# Patient Record
Sex: MALE | Race: WHITE | NOT HISPANIC OR LATINO | Employment: OTHER | URBAN - NONMETROPOLITAN AREA
[De-identification: names, ages, dates, MRNs, and addresses within clinical notes are randomized per-mention and may not be internally consistent; named-entity substitution may affect disease eponyms.]

---

## 2017-01-16 ENCOUNTER — OFFICE VISIT (OUTPATIENT)
Dept: INTERNAL MEDICINE | Facility: CLINIC | Age: 82
End: 2017-01-16

## 2017-01-16 VITALS
TEMPERATURE: 98.5 F | OXYGEN SATURATION: 97 % | SYSTOLIC BLOOD PRESSURE: 120 MMHG | HEART RATE: 67 BPM | HEIGHT: 71 IN | BODY MASS INDEX: 24.83 KG/M2 | WEIGHT: 177.38 LBS | DIASTOLIC BLOOD PRESSURE: 80 MMHG

## 2017-01-16 DIAGNOSIS — N40.1 BENIGN NON-NODULAR PROSTATIC HYPERPLASIA WITH LOWER URINARY TRACT SYMPTOMS: ICD-10-CM

## 2017-01-16 DIAGNOSIS — F02.80 LATE ONSET ALZHEIMER'S DISEASE WITHOUT BEHAVIORAL DISTURBANCE (HCC): Primary | ICD-10-CM

## 2017-01-16 DIAGNOSIS — G30.1 LATE ONSET ALZHEIMER'S DISEASE WITHOUT BEHAVIORAL DISTURBANCE (HCC): Primary | ICD-10-CM

## 2017-01-16 LAB
ALBUMIN SERPL-MCNC: 3.7 G/DL (ref 3.2–4.8)
ALBUMIN/GLOB SERPL: 1.2 G/DL (ref 1.5–2.5)
ALP SERPL-CCNC: 76 U/L (ref 25–100)
ALT SERPL W P-5'-P-CCNC: 18 U/L (ref 7–40)
ANION GAP SERPL CALCULATED.3IONS-SCNC: 11 MMOL/L (ref 3–11)
AST SERPL-CCNC: 24 U/L (ref 0–33)
BILIRUB SERPL-MCNC: 0.4 MG/DL (ref 0.3–1.2)
BUN BLD-MCNC: 23 MG/DL (ref 9–23)
BUN/CREAT SERPL: 20.9 (ref 7–25)
CALCIUM SPEC-SCNC: 9.5 MG/DL (ref 8.7–10.4)
CHLORIDE SERPL-SCNC: 104 MMOL/L (ref 99–109)
CO2 SERPL-SCNC: 28 MMOL/L (ref 20–31)
CREAT BLD-MCNC: 1.1 MG/DL (ref 0.6–1.3)
FOLATE SERPL-MCNC: 12.04 NG/ML (ref 3.2–20)
GFR SERPL CREATININE-BSD FRML MDRD: 64 ML/MIN/1.73
GLOBULIN UR ELPH-MCNC: 3.2 GM/DL
GLUCOSE BLD-MCNC: 73 MG/DL (ref 70–100)
POTASSIUM BLD-SCNC: 4.5 MMOL/L (ref 3.5–5.5)
PROT SERPL-MCNC: 6.9 G/DL (ref 5.7–8.2)
SODIUM BLD-SCNC: 143 MMOL/L (ref 132–146)
VIT B12 BLD-MCNC: 594 PG/ML (ref 211–911)

## 2017-01-16 PROCEDURE — 82746 ASSAY OF FOLIC ACID SERUM: CPT | Performed by: INTERNAL MEDICINE

## 2017-01-16 PROCEDURE — 84207 ASSAY OF VITAMIN B-6: CPT | Performed by: INTERNAL MEDICINE

## 2017-01-16 PROCEDURE — 82607 VITAMIN B-12: CPT | Performed by: INTERNAL MEDICINE

## 2017-01-16 PROCEDURE — 80053 COMPREHEN METABOLIC PANEL: CPT | Performed by: INTERNAL MEDICINE

## 2017-01-16 PROCEDURE — 99213 OFFICE O/P EST LOW 20 MIN: CPT | Performed by: INTERNAL MEDICINE

## 2017-01-16 PROCEDURE — 36415 COLL VENOUS BLD VENIPUNCTURE: CPT | Performed by: INTERNAL MEDICINE

## 2017-01-16 RX ORDER — FINASTERIDE 5 MG/1
5 TABLET, FILM COATED ORAL DAILY
Qty: 90 TABLET | Refills: 3 | Status: SHIPPED | OUTPATIENT
Start: 2017-01-16 | End: 2017-07-17 | Stop reason: SDUPTHER

## 2017-01-16 NOTE — MR AVS SNAPSHOT
Sd Anderson   1/16/2017 9:30 AM   Office Visit    Provider:  Andrey Jade MD   Department:  [facility]   Dept Phone:  [phone]                Your Full Care Plan              Today's Medication Changes          These changes are accurate as of: 1/16/17  9:55 AM.  If you have any questions, ask your nurse or doctor.               Stop taking medication(s)listed here:     nitrofurantoin 100 MG capsule   Commonly known as:  MACRODANTIN   Stopped by:  Andrey Jade MD                Where to Get Your Medications      These medications were sent to [id number] - [place], [place] - [address] - [phone]  - [phone] FX  [address] [address]     Phone:  [phone]     finasteride 5 MG tablet                  Your Updated Medication List          This list is accurate as of: 1/16/17  9:55 AM.  Always use your most recent med list.                finasteride 5 MG tablet   Commonly known as:  PROSCAR   Take 1 tablet by mouth Daily.       latanoprost 0.005 % ophthalmic solution   Commonly known as:  XALATAN       tamsulosin 0.4 MG capsule 24 hr capsule   Commonly known as:  FLOMAX   Take 1 capsule by mouth every night.               We Performed the Following     Comprehensive Metabolic Panel     Folate     Vitamin B12     Vitamin B6       You Were Diagnosed With        Codes Comments    Late onset Alzheimer's disease without behavioral disturbance    -  Primary ICD-10-CM: G30.1, F02.80  ICD-9-CM: 331.0, 294.10     Benign non-nodular prostatic hyperplasia with lower urinary tract symptoms     ICD-10-CM: N40.1  ICD-9-CM: 600.91       Instructions     None    Patient Instructions History      MyChart Signup     Our records indicate that you have declined [facility] [email or web address] signup. If you would like to sign up for [employer], please email [email or web address] or call [phone] to obtain an activation  "code.             Other Info from Your Visit           Allergies     Levofloxacin        Reason for Visit     Follow-up 6 month      Vital Signs     Blood Pressure Pulse Temperature Height Weight Oxygen Saturation    120/80 67 98.5 °F (36.9 °C) 71\" (180.3 cm) 177 lb 6 oz (80.5 kg) 97%    Body Mass Index Smoking Status                24.74 kg/m2 Former Smoker          Problems and Diagnoses Noted     Benign non-nodular prostatic hyperplasia with lower urinary tract symptoms    Alzheimer's disease      Results       "

## 2017-01-16 NOTE — PROGRESS NOTES
Subjective  Sd Anderson is a 83 y.o. male    HPI coming in for follow-up he is accompanied by his wife was giving us some of the history patient with a history of BPH nocturia 2-3 times a night is on Flomax and finasteride history of dementia with significant short-term memory deficit. No alcohol or tobacco use    The following portions of the patient's history were reviewed and updated as appropriate: allergies, current medications, past family history, past medical history, past social history, past surgical history, and problem list.     Review of Systems   Constitutional: Negative.  Negative for activity change, appetite change, fatigue and fever.   HENT: Positive for congestion, rhinorrhea and sneezing. Negative for ear discharge, ear pain and trouble swallowing.    Eyes: Positive for itching. Negative for photophobia and visual disturbance.   Respiratory: Positive for cough and shortness of breath.    Cardiovascular: Negative for chest pain and palpitations.   Gastrointestinal: Negative for abdominal distention, abdominal pain, constipation, diarrhea, nausea and vomiting.   Endocrine: Negative.    Genitourinary: Positive for frequency and urgency. Negative for dysuria and hematuria.   Musculoskeletal: Positive for arthralgias and back pain. Negative for joint swelling and myalgias.   Skin: Negative for color change and rash.   Allergic/Immunologic: Positive for environmental allergies.   Neurological: Negative for dizziness, weakness, light-headedness and headaches.   Hematological: Negative for adenopathy. Does not bruise/bleed easily.   Psychiatric/Behavioral: Positive for confusion and sleep disturbance. Negative for agitation and dysphoric mood. The patient is not nervous/anxious.        Vitals:    01/16/17 0917   BP: 120/80   Pulse: 67   Temp: 98.5 °F (36.9 °C)   SpO2: 97%       Objective  Physical Exam   Constitutional: He is oriented to person, place, and time. He appears well-developed and  well-nourished. No distress.   HENT:   Nose: Nose normal.   Mouth/Throat: Oropharynx is clear and moist.   Eyes: Conjunctivae and EOM are normal. No scleral icterus.   Neck: No tracheal deviation present. No thyromegaly present.   Cardiovascular: Normal rate and regular rhythm.  Exam reveals no friction rub.    No murmur heard.  Pulmonary/Chest: No respiratory distress. He has no wheezes. He has no rales.   Abdominal: Soft. He exhibits no distension and no mass. There is no tenderness. There is no guarding.   Musculoskeletal: Normal range of motion. He exhibits no deformity.   Lymphadenopathy:     He has no cervical adenopathy.   Neurological: He is alert and oriented to person, place, and time. He has normal reflexes. No cranial nerve deficit. Coordination normal.   Skin: Skin is warm and dry. No rash noted. No erythema.   Psychiatric: He has a normal mood and affect. His behavior is normal.       Diagnoses and all orders for this visit:    Late onset Alzheimer's disease without behavioral disturbance. Behaviorally stable repeat lab work to rule out vitamin deficiencies    Benign non-nodular prostatic hyperplasia with lower urinary tract symptoms he did cut down on fluid intake and p.m. continue with Flomax and finasteride

## 2017-01-18 ENCOUNTER — TELEPHONE (OUTPATIENT)
Dept: INTERNAL MEDICINE | Facility: CLINIC | Age: 82
End: 2017-01-18

## 2017-01-18 LAB — VIT B6 SERPL-MCNC: 6.3 UG/L (ref 5.3–46.7)

## 2017-01-18 NOTE — TELEPHONE ENCOUNTER
----- Message from Andrey Jade MD sent at 1/17/2017 11:16 AM EST -----  Please inform patient labs are okay.

## 2017-02-14 ENCOUNTER — OFFICE VISIT (OUTPATIENT)
Dept: INTERNAL MEDICINE | Facility: CLINIC | Age: 82
End: 2017-02-14

## 2017-02-14 VITALS
WEIGHT: 177 LBS | OXYGEN SATURATION: 99 % | DIASTOLIC BLOOD PRESSURE: 70 MMHG | TEMPERATURE: 98.1 F | HEART RATE: 72 BPM | SYSTOLIC BLOOD PRESSURE: 120 MMHG | HEIGHT: 71 IN | BODY MASS INDEX: 24.78 KG/M2

## 2017-02-14 DIAGNOSIS — M19.90 ARTHRITIS: ICD-10-CM

## 2017-02-14 DIAGNOSIS — R26.9 ABNORMALITY OF GAIT: ICD-10-CM

## 2017-02-14 DIAGNOSIS — G89.29 CHRONIC BILATERAL LOW BACK PAIN WITHOUT SCIATICA: Primary | ICD-10-CM

## 2017-02-14 DIAGNOSIS — M54.50 CHRONIC BILATERAL LOW BACK PAIN WITHOUT SCIATICA: Primary | ICD-10-CM

## 2017-02-14 PROCEDURE — 99213 OFFICE O/P EST LOW 20 MIN: CPT | Performed by: PHYSICIAN ASSISTANT

## 2017-02-14 RX ORDER — NAPROXEN 375 MG/1
375 TABLET ORAL 2 TIMES DAILY PRN
Qty: 60 TABLET | Refills: 1 | Status: SHIPPED | OUTPATIENT
Start: 2017-02-14 | End: 2018-08-13

## 2017-02-14 RX ORDER — IBUPROFEN 200 MG
200 TABLET ORAL EVERY 6 HOURS PRN
COMMUNITY
End: 2017-02-14

## 2017-02-14 NOTE — PROGRESS NOTES
Sd Anderson is a 83 y.o. male.     Subjective   History of Present Illness   Here today with concern of low back pain into the left hip. Originally had pain in the right hip which transferred to the left hip in the last few weeks.  Pain worse when he wakes each morning when he first gets up as well as when he stands or sits after being in a position for a prolonged period. Pain lessens when he is active. Taking Ibuprofen for pain which helps as well as a topical cream. Denies numbness, tingling or burning.             The following portions of the patient's history were reviewed and updated as appropriate: allergies, current medications, past family history, past medical history, past social history, past surgical history and problem list.    Review of Systems    Constitutional: Negative for appetite change, chills, fatigue, fever and unexpected weight change.   HENT: Negative for congestion, ear pain, hearing loss, nosebleeds, postnasal drip, rhinorrhea, sore throat, tinnitus and trouble swallowing.    Eyes: Negative for photophobia, discharge and visual disturbance.   Respiratory: Negative for cough, chest tightness, shortness of breath and wheezing.    Cardiovascular: Negative for chest pain, palpitations and leg swelling.   Gastrointestinal: Negative for abdominal distention, abdominal pain, blood in stool, constipation, diarrhea, nausea and vomiting.   Endocrine: Negative for cold intolerance, heat intolerance, polydipsia, polyphagia and polyuria.   Musculoskeletal: Low back pain and left hip pain.  Negative for joint swelling, myalgias, neck pain and neck stiffness.   Skin: Negative for color change, pallor, rash and wound.   Allergic/Immunologic: Negative for environmental allergies, food allergies and immunocompromised state.   Neurological: Negative for dizziness, tremors, seizures, weakness, numbness and headaches.   Hematological: Negative for adenopathy. Does not bruise/bleed easily.  "  Psychiatric/Behavioral: Negative for agitation, behavioral problems, confusion, hallucinations, self-injury and suicidal ideas. The patient is not nervous/anxious.      Objective    Physical Exam  Constitutional: Oriented to person, place, and time. Appears well-developed and well-nourished.   HENT:   Head: Normocephalic and atraumatic.   Eyes: EOM are normal. Pupils are equal, round, and reactive to light.   Neck: Normal range of motion. Neck supple.   Cardiovascular: Normal rate, regular rhythm and normal heart sounds.    Pulmonary/Chest: Effort normal and breath sounds normal. No respiratory distress.  Has no wheezes or rales. Exhibits no chest wall tenderness.   Abdominal: Soft. Bowel sounds are normal. Exhibits no distension and no mass. There is no tenderness.   Musculoskeletal: Slowed gait due to pain. Forward flexion causes pulling sensation in bilateral hamstrings. Decreased forward range of motion. Exhibits no vertebral or paraspinal muscle tenderness.   Neurological: Alert and oriented to person, place, and time.   Skin: Skin is warm and dry.   Psychiatric: Has a normal mood and affect. Behavior is normal. Judgment and thought content normal.       Visit Vitals   • /70   • Pulse 72   • Temp 98.1 °F (36.7 °C)   • Ht 71\" (180.3 cm)   • Wt 177 lb (80.3 kg)   • SpO2 99%   • BMI 24.69 kg/m2       Nursing note and vitals reviewed.          Assessment/Plan   Sd was seen today for back pain and hip pain.    Diagnoses and all orders for this visit:    Chronic bilateral low back pain without sciatica  -     naproxen (NAPROSYN) 375 MG tablet; Take 1 tablet by mouth 2 (Two) Times a Day As Needed for mild pain (1-3).  -     Ambulatory Referral to Home Health    Arthritis  -     naproxen (NAPROSYN) 375 MG tablet; Take 1 tablet by mouth 2 (Two) Times a Day As Needed for mild pain (1-3).  -     Ambulatory Referral to Home Health    Abnormality of gait  -     Ambulatory Referral to Home Health             "

## 2017-03-20 ENCOUNTER — OUTSIDE FACILITY SERVICE (OUTPATIENT)
Dept: INTERNAL MEDICINE | Facility: CLINIC | Age: 82
End: 2017-03-20

## 2017-03-20 PROCEDURE — G0180 MD CERTIFICATION HHA PATIENT: HCPCS | Performed by: INTERNAL MEDICINE

## 2017-07-17 ENCOUNTER — OFFICE VISIT (OUTPATIENT)
Dept: INTERNAL MEDICINE | Facility: CLINIC | Age: 82
End: 2017-07-17

## 2017-07-17 VITALS
HEART RATE: 66 BPM | DIASTOLIC BLOOD PRESSURE: 80 MMHG | OXYGEN SATURATION: 94 % | WEIGHT: 177.38 LBS | SYSTOLIC BLOOD PRESSURE: 120 MMHG | HEIGHT: 71 IN | BODY MASS INDEX: 24.83 KG/M2 | TEMPERATURE: 98.6 F

## 2017-07-17 DIAGNOSIS — F02.80 LATE ONSET ALZHEIMER'S DISEASE WITHOUT BEHAVIORAL DISTURBANCE (HCC): Primary | ICD-10-CM

## 2017-07-17 DIAGNOSIS — N40.1 BENIGN NON-NODULAR PROSTATIC HYPERPLASIA WITH LOWER URINARY TRACT SYMPTOMS: ICD-10-CM

## 2017-07-17 DIAGNOSIS — G30.1 LATE ONSET ALZHEIMER'S DISEASE WITHOUT BEHAVIORAL DISTURBANCE (HCC): Primary | ICD-10-CM

## 2017-07-17 DIAGNOSIS — R25.1 TREMOR: ICD-10-CM

## 2017-07-17 PROCEDURE — 99214 OFFICE O/P EST MOD 30 MIN: CPT | Performed by: INTERNAL MEDICINE

## 2017-07-17 PROCEDURE — G0009 ADMIN PNEUMOCOCCAL VACCINE: HCPCS | Performed by: INTERNAL MEDICINE

## 2017-07-17 PROCEDURE — 90670 PCV13 VACCINE IM: CPT | Performed by: INTERNAL MEDICINE

## 2017-07-17 RX ORDER — TAMSULOSIN HYDROCHLORIDE 0.4 MG/1
1 CAPSULE ORAL NIGHTLY
Qty: 90 CAPSULE | Refills: 3 | Status: SHIPPED | OUTPATIENT
Start: 2017-07-17 | End: 2018-09-10 | Stop reason: SDUPTHER

## 2017-07-17 RX ORDER — FINASTERIDE 5 MG/1
5 TABLET, FILM COATED ORAL DAILY
Qty: 90 TABLET | Refills: 3 | Status: SHIPPED | OUTPATIENT
Start: 2017-07-17 | End: 2018-08-20 | Stop reason: SDUPTHER

## 2017-07-17 NOTE — PROGRESS NOTES
Subjective  Sd Anderson is a 83 y.o. male    HPI coming in for follow-up is accompanied by his wife patient with significant short-term memory deficit and tremors history of BPH but without dysuria or hematuria nonsmoker tolerating his medications well    The following portions of the patient's history were reviewed and updated as appropriate: allergies, current medications, past family history, past medical history, past social history, past surgical history, and problem list.     Review of Systems   Constitutional: Negative.  Negative for activity change, appetite change, fatigue and fever.   HENT: Negative for congestion, ear discharge, ear pain and trouble swallowing.    Eyes: Negative for photophobia and visual disturbance.   Respiratory: Negative for cough and shortness of breath.    Cardiovascular: Negative for chest pain and palpitations.   Gastrointestinal: Negative for abdominal distention, abdominal pain, constipation, diarrhea, nausea and vomiting.   Endocrine: Negative.    Genitourinary: Positive for difficulty urinating. Negative for dysuria, hematuria and urgency.   Musculoskeletal: Negative for arthralgias, back pain, joint swelling and myalgias.   Skin: Negative for color change and rash.   Allergic/Immunologic: Negative.    Neurological: Positive for tremors. Negative for dizziness, weakness, light-headedness and headaches.   Hematological: Negative for adenopathy. Does not bruise/bleed easily.   Psychiatric/Behavioral: Positive for confusion and sleep disturbance. Negative for agitation and dysphoric mood. The patient is not nervous/anxious.        Vitals:    07/17/17 1400   BP: 120/80   Pulse: 66   Temp: 98.6 °F (37 °C)   SpO2: 94%       Objective  Physical Exam   Constitutional: He is oriented to person, place, and time. He appears well-developed and well-nourished. No distress.   HENT:   Nose: Nose normal.   Mouth/Throat: Oropharynx is clear and moist.   Eyes: Conjunctivae and EOM are  normal. No scleral icterus.   Neck: No tracheal deviation present. No thyromegaly present.   Cardiovascular: Normal rate and regular rhythm.  Exam reveals no friction rub.    No murmur heard.  Pulmonary/Chest: No respiratory distress. He has no wheezes. He has no rales.   Abdominal: Soft. He exhibits no distension and no mass. There is no tenderness. There is no guarding.   Musculoskeletal: Normal range of motion. He exhibits no deformity.   Lymphadenopathy:     He has no cervical adenopathy.   Neurological: He is alert and oriented to person, place, and time. He has normal reflexes. No cranial nerve deficit. Coordination normal.   Skin: Skin is warm and dry. No rash noted. No erythema.   Lipoma upper back   Psychiatric: He has a normal mood and affect. His behavior is normal.       Diagnoses and all orders for this visit:    Late onset Alzheimer's disease without behavioral disturbance stable without behavioral disturbances sleep okay    Benign non-nodular prostatic hyperplasia with lower urinary tract symptoms continue with tamsulosin and finasteride    Tremor stable    Other orders  -     tamsulosin (FLOMAX) 0.4 MG capsule 24 hr capsule; Take 1 capsule by mouth Every Night.  -     finasteride (PROSCAR) 5 MG tablet; Take 1 tablet by mouth Daily.

## 2017-10-27 ENCOUNTER — OFFICE VISIT (OUTPATIENT)
Dept: INTERNAL MEDICINE | Facility: CLINIC | Age: 82
End: 2017-10-27

## 2017-10-27 VITALS
SYSTOLIC BLOOD PRESSURE: 130 MMHG | BODY MASS INDEX: 25.34 KG/M2 | DIASTOLIC BLOOD PRESSURE: 80 MMHG | WEIGHT: 181 LBS | HEART RATE: 60 BPM | HEIGHT: 71 IN | OXYGEN SATURATION: 97 % | TEMPERATURE: 98.4 F

## 2017-10-27 DIAGNOSIS — R05.9 COUGH: ICD-10-CM

## 2017-10-27 DIAGNOSIS — M79.671 PAIN IN BOTH FEET: Primary | ICD-10-CM

## 2017-10-27 DIAGNOSIS — M79.672 PAIN IN BOTH FEET: Primary | ICD-10-CM

## 2017-10-27 DIAGNOSIS — K30 INDIGESTION: ICD-10-CM

## 2017-10-27 PROCEDURE — 99214 OFFICE O/P EST MOD 30 MIN: CPT | Performed by: INTERNAL MEDICINE

## 2017-10-27 RX ORDER — OMEPRAZOLE 20 MG/1
20 CAPSULE, DELAYED RELEASE ORAL DAILY
Qty: 30 CAPSULE | Refills: 0 | Status: SHIPPED | OUTPATIENT
Start: 2017-10-27 | End: 2018-04-26

## 2017-10-27 NOTE — PROGRESS NOTES
"Subjective  Sd Anderson is a 83 y.o. male    HPI coming in for evaluation brought in by his wife was giving us some of the history has had complaints of right big toe pain along with bilateral foot pain ongoing for about a week no new trauma has had ingrowing toenails in the past has been complaining of epigastric discomfort and indigestion denies constipation no blood in his stools no recent change in meds.  Dry cough for a few days now without associated fever or chills nonsmoker    The following portions of the patient's history were reviewed and updated as appropriate: allergies, current medications, past family history, past medical history, past social history, past surgical history, and problem list.     Review of Systems   Constitutional: Negative.  Negative for activity change, appetite change, fatigue and fever.   HENT: Negative for congestion, ear discharge, ear pain and trouble swallowing.    Eyes: Negative for photophobia and visual disturbance.   Respiratory: Positive for cough. Negative for shortness of breath.    Cardiovascular: Negative for chest pain and palpitations.   Gastrointestinal: Negative for abdominal distention, abdominal pain, constipation, diarrhea, nausea and vomiting.   Endocrine: Negative.    Genitourinary: Positive for difficulty urinating. Negative for dysuria, hematuria and urgency.   Musculoskeletal: Positive for arthralgias. Negative for back pain, joint swelling and myalgias.   Skin: Negative for color change and rash.   Allergic/Immunologic: Negative.    Neurological: Positive for tremors. Negative for dizziness, weakness, light-headedness and headaches.   Hematological: Negative for adenopathy. Does not bruise/bleed easily.   Psychiatric/Behavioral: Positive for confusion and sleep disturbance. Negative for agitation and dysphoric mood. The patient is not nervous/anxious.        Visit Vitals   • /80   • Pulse 60   • Temp 98.4 °F (36.9 °C)   • Ht 71\" (180.3 cm)   • " Wt 181 lb (82.1 kg)   • SpO2 97%   • BMI 25.24 kg/m2       Objective  Physical Exam   Constitutional: He is oriented to person, place, and time. He appears well-developed and well-nourished. No distress.   HENT:   Nose: Nose normal.   Mouth/Throat: Oropharynx is clear and moist.   Eyes: Conjunctivae and EOM are normal. No scleral icterus.   Neck: No tracheal deviation present. No thyromegaly present.   Cardiovascular: Normal rate and regular rhythm.  Exam reveals no friction rub.    No murmur heard.  Pulmonary/Chest: No respiratory distress. He has no wheezes. He has no rales.   Abdominal: Soft. He exhibits no distension and no mass. There is no tenderness. There is no guarding.   Musculoskeletal: Normal range of motion. He exhibits no deformity.   Lymphadenopathy:     He has no cervical adenopathy.   Neurological: He is alert and oriented to person, place, and time. He has normal reflexes. No cranial nerve deficit. Coordination normal.   Skin: Skin is warm and dry. Rash noted. There is erythema.   Psychiatric: He has a normal mood and affect. His behavior is normal. Judgment and thought content normal.       Diagnoses and all orders for this visit:    Pain in both feet discussed cleaning is toes with soap and water. Has evidence of ingrown toenail without evidence of cellulitis hold off on antibiotic use. He is going to follow-up with a podiatrist    Indigestion trial of proton pump inhibitor. Discussed bowel regimen    Cough continue with conservative measures cetirizine.    Other orders  -     omeprazole (priLOSEC) 20 MG capsule; Take 1 capsule by mouth Daily.  -     Cancel: Pneumococcal Conjugate Vaccine 13-Valent All

## 2018-01-11 ENCOUNTER — OFFICE VISIT (OUTPATIENT)
Dept: ORTHOPEDIC SURGERY | Facility: CLINIC | Age: 83
End: 2018-01-11

## 2018-01-11 VITALS — HEIGHT: 72 IN | RESPIRATION RATE: 18 BRPM | WEIGHT: 182 LBS | BODY MASS INDEX: 24.65 KG/M2

## 2018-01-11 DIAGNOSIS — S90.229A SUBUNGUAL HEMATOMA OF FOOT, UNSPECIFIED LATERALITY, INITIAL ENCOUNTER: ICD-10-CM

## 2018-01-11 DIAGNOSIS — B35.1 ONYCHOMYCOSIS: ICD-10-CM

## 2018-01-11 DIAGNOSIS — L60.0 INGROWN NAIL: Primary | ICD-10-CM

## 2018-01-11 PROCEDURE — 99204 OFFICE O/P NEW MOD 45 MIN: CPT | Performed by: PODIATRIST

## 2018-01-11 PROCEDURE — 11721 DEBRIDE NAIL 6 OR MORE: CPT | Performed by: PODIATRIST

## 2018-01-11 NOTE — PROGRESS NOTES
Subjective   Patient ID: Sd Anderson is a 84 y.o. male   Ingrown Toenail of the Right Foot  Comes in today with his wife concerned about his great toenails.  They state that they live and an assisted living facility and typically there is a podiatrist that comes there and cuts her nails for them and has been keeping an eye on his great toenails however he was not coming until later next month and they were concerned and wanted to have these looked at.  He denies any pain or discomfort but just complains of discoloration.  He seems to have slight dementia and his wife states that he does not remember things well.  He denies any history of injury or trauma that he is aware of.  They state they have been like this and discolored for months.  They deny any extension into the skin.    History of Present Illness       Pain Location: Foot  Pain Orientation: Right     Pain Descriptors: Aching        Date Pain First Started:  (October 2017)                 Result of Injury: No       Review of Systems   Constitutional: Negative for diaphoresis, fever and unexpected weight change.   HENT: Negative for dental problem and sore throat.    Eyes: Negative for visual disturbance.   Respiratory: Negative for shortness of breath.    Cardiovascular: Negative for chest pain.   Gastrointestinal: Negative for abdominal pain, constipation, diarrhea, nausea and vomiting.   Genitourinary: Negative for difficulty urinating and frequency.   Neurological: Negative for headaches.   Hematological: Does not bruise/bleed easily.   All other systems reviewed and are negative.      Past Medical History:   Diagnosis Date   • BPH (benign prostatic hyperplasia)    • Dementia    • Urinary tract infection         Past Surgical History:   Procedure Laterality Date   • CATARACT EXTRACTION, BILATERAL         Allergies   Allergen Reactions   • Levofloxacin        History reviewed. No pertinent family history.    Social History     Social History   •  Marital status:      Spouse name: N/A   • Number of children: N/A   • Years of education: N/A     Occupational History   • Not on file.     Social History Main Topics   • Smoking status: Former Smoker   • Smokeless tobacco: Never Used   • Alcohol use No      Comment: rarely   • Drug use: No   • Sexual activity: Not on file     Other Topics Concern   • Not on file     Social History Narrative       Counseling given: Not Answered       I have reviewed all of the above social hx, family hx, surgical hx, medications, allergies & ROS and confirm that it is accurate.  Objective   Physical Exam   Constitutional: He is oriented to person, place, and time. He appears well-developed and well-nourished.   HENT:   Head: Normocephalic and atraumatic.   Nose: Nose normal.   Eyes: Conjunctivae and EOM are normal. Pupils are equal, round, and reactive to light.   Neck: Normal range of motion.   Cardiovascular: Normal rate.    Pulmonary/Chest: Effort normal.   Abdominal: Soft.   Neurological: He is alert and oriented to person, place, and time.   Skin: Skin is warm.   Psychiatric: He has a normal mood and affect. His behavior is normal. Judgment and thought content normal.   Nursing note reviewed.    Ortho Exam  Ortho Exam bilateral  Lower extremity exam:  Vascular: Pulses are palpable, pedal hair is noted, no edema noted, varicose veins noted, CFT is less than 5 seconds  Neuro: Gross sensation and reflexes intact  Derm: Nails 1 through 5 on both feet are thickened, elongated, incurvated, discolored, with subungual debris.  The hallux nails bilaterally are roughly 90% covered with underlying a dried hematoma.  The nails are a mixup purple and brown in color.  There is subtle clearing and normal nail margin approximately.  I am able to scrape out dried blood underneath the nail beds which are not well attached.  There is no extension or abnormality of discoloration within the skin.  MSK: Aside from dorsal digital contractures  of digits 2 through 5 bilaterally and slight hallux valgus deformity there are no significant orthopedic deformities.  Joint range of motion is normal.  Manual muscle testing normal.    Assessment/Plan onychomycosis.  Subungual hematoma bilateral hallux  Independent Review of Radiographic Studies:      Laboratory and Other Studies:     Medical Decision Making:        Procedures nails 1 through 5 on both feet were sharply debrided without incident  [] No procedures were performed in office today.    Sd was seen today for ingrown toenail.    Diagnoses and all orders for this visit:    Ingrown nail    Subungual hematoma of foot, unspecified laterality, initial encounter    Onychomycosis          Recommendations/Plan:  I discussed his nails and the discoloration with him and explained this is dried blood and most likely he is bumping his toes or stubbing his toes or dropping something on them or they're rubbing in his shoe and he just does not realize it or feel it.  I discussed melanoma with him but showed them the dried blood that could be removed from underneath the nail and explained with no extension into the adjacent skin this is less likely.  I explained the timeframe that it takes for her nail to grow out with subungual hematoma.  It seems as if he has a fairly close monitoring between his wife and health care providers in his assisted living facility.  I recommend they continue to monitor this but explained as long as it is not painful I would not recommend any further treatment other than cutting his nails on a regular basis.  I explained if the nails do become painful that sometimes the nail has to be removed to alleviate pressure.  They can follow-up with me when necessary.    Return if symptoms worsen or fail to improve.  Patient agreeable to call or return sooner for any concerns.

## 2018-04-26 ENCOUNTER — OFFICE VISIT (OUTPATIENT)
Dept: INTERNAL MEDICINE | Facility: CLINIC | Age: 83
End: 2018-04-26

## 2018-04-26 VITALS
HEART RATE: 65 BPM | BODY MASS INDEX: 25.06 KG/M2 | HEIGHT: 72 IN | TEMPERATURE: 97.3 F | WEIGHT: 185 LBS | SYSTOLIC BLOOD PRESSURE: 110 MMHG | OXYGEN SATURATION: 97 % | DIASTOLIC BLOOD PRESSURE: 60 MMHG

## 2018-04-26 DIAGNOSIS — F02.80 LATE ONSET ALZHEIMER'S DISEASE WITHOUT BEHAVIORAL DISTURBANCE (HCC): ICD-10-CM

## 2018-04-26 DIAGNOSIS — N40.1 BENIGN NON-NODULAR PROSTATIC HYPERPLASIA WITH LOWER URINARY TRACT SYMPTOMS: Primary | ICD-10-CM

## 2018-04-26 DIAGNOSIS — G30.1 LATE ONSET ALZHEIMER'S DISEASE WITHOUT BEHAVIORAL DISTURBANCE (HCC): ICD-10-CM

## 2018-04-26 DIAGNOSIS — R25.1 TREMOR: ICD-10-CM

## 2018-04-26 PROCEDURE — 99214 OFFICE O/P EST MOD 30 MIN: CPT | Performed by: INTERNAL MEDICINE

## 2018-04-26 NOTE — PROGRESS NOTES
"Subjective  Sd Anderson is a 84 y.o. male    HPI coming in for follow-up is accompanied by his wife patient with Alzheimer type dementia has BPH doing reasonably well with current meds no new complaints behaviorally stable has an erratic sleep    The following portions of the patient's history were reviewed and updated as appropriate: allergies, current medications, past family history, past medical history, past social history, past surgical history, and problem list.     Review of Systems   Constitutional: Negative.  Negative for activity change, appetite change, fatigue and fever.   HENT: Negative for congestion, ear discharge, ear pain and trouble swallowing.    Eyes: Negative for photophobia and visual disturbance.   Respiratory: Positive for cough. Negative for shortness of breath.    Cardiovascular: Negative for chest pain and palpitations.   Gastrointestinal: Negative for abdominal distention, abdominal pain, constipation, diarrhea, nausea and vomiting.   Endocrine: Negative.    Genitourinary: Positive for difficulty urinating. Negative for dysuria, hematuria and urgency.   Musculoskeletal: Positive for arthralgias. Negative for back pain, joint swelling and myalgias.   Skin: Negative for color change and rash.   Allergic/Immunologic: Negative.    Neurological: Positive for tremors. Negative for dizziness, weakness, light-headedness and headaches.   Hematological: Negative for adenopathy. Does not bruise/bleed easily.   Psychiatric/Behavioral: Positive for confusion and sleep disturbance. Negative for agitation and dysphoric mood. The patient is not nervous/anxious.        Visit Vitals  /60   Pulse 65   Temp 97.3 °F (36.3 °C)   Ht 182.9 cm (72\")   Wt 83.9 kg (185 lb)   SpO2 97%   BMI 25.09 kg/m²       Objective  Physical Exam   Constitutional: He is oriented to person, place, and time. He appears well-developed and well-nourished. No distress.   HENT:   Nose: Nose normal.   Mouth/Throat: Oropharynx " is clear and moist.   Eyes: Conjunctivae and EOM are normal. No scleral icterus.   Neck: No tracheal deviation present. No thyromegaly present.   Cardiovascular: Normal rate and regular rhythm.  Exam reveals no friction rub.    No murmur heard.  Pulmonary/Chest: No respiratory distress. He has no wheezes. He has no rales.   Abdominal: Soft. He exhibits no distension and no mass. There is no tenderness. There is no guarding.   Musculoskeletal: Normal range of motion. He exhibits deformity.   Lymphadenopathy:     He has no cervical adenopathy.   Neurological: He is alert and oriented to person, place, and time. He has normal reflexes. No cranial nerve deficit. Coordination normal.   Skin: Skin is warm and dry. Rash noted. There is erythema.   Soft subcutaneous tumor on upper back   Psychiatric: He has a normal mood and affect. His behavior is normal. Judgment and thought content normal.       Diagnoses and all orders for this visit:    Benign non-nodular prostatic hyperplasia with lower urinary tract symptoms continue with current meds cutdown on fluid intake in p.m.    Tremor stable    Late onset Alzheimer's disease without behavioral disturbance stable with current meds

## 2018-08-13 ENCOUNTER — OFFICE VISIT (OUTPATIENT)
Dept: INTERNAL MEDICINE | Facility: CLINIC | Age: 83
End: 2018-08-13

## 2018-08-13 VITALS
WEIGHT: 182 LBS | BODY MASS INDEX: 24.65 KG/M2 | HEIGHT: 72 IN | DIASTOLIC BLOOD PRESSURE: 80 MMHG | SYSTOLIC BLOOD PRESSURE: 120 MMHG | TEMPERATURE: 97.6 F | HEART RATE: 67 BPM | OXYGEN SATURATION: 95 %

## 2018-08-13 DIAGNOSIS — G30.1 LATE ONSET ALZHEIMER'S DISEASE WITHOUT BEHAVIORAL DISTURBANCE (HCC): Primary | ICD-10-CM

## 2018-08-13 DIAGNOSIS — F02.80 LATE ONSET ALZHEIMER'S DISEASE WITHOUT BEHAVIORAL DISTURBANCE (HCC): Primary | ICD-10-CM

## 2018-08-13 DIAGNOSIS — R27.0 ATAXIA: ICD-10-CM

## 2018-08-13 DIAGNOSIS — N40.1 BENIGN NON-NODULAR PROSTATIC HYPERPLASIA WITH LOWER URINARY TRACT SYMPTOMS: ICD-10-CM

## 2018-08-13 PROCEDURE — 99214 OFFICE O/P EST MOD 30 MIN: CPT | Performed by: INTERNAL MEDICINE

## 2018-08-13 NOTE — PROGRESS NOTES
"Subjective  Sd Anderson is a 84 y.o. male    HPI patient brought in by spouse has had a few recent falls without loss of consciousness. Once while he was trying to sit he trying to lean back against a bench and fell over. Has not had seizure activity. Appears to have increasing imbalance. No recent head injury no change in meds has not had complaints of bowel or bladder incontinence. No alcohol use has had dementia Alzheimer type diagnosed a few years ago.    The following portions of the patient's history were reviewed and updated as appropriate: allergies, current medications, past family history, past medical history, past social history, past surgical history, and problem list.     Review of Systems   Constitutional: Positive for fatigue. Negative for activity change, appetite change and fever.   HENT: Negative for congestion, ear discharge, ear pain and trouble swallowing.    Eyes: Negative for photophobia and visual disturbance.   Respiratory: Negative for cough and shortness of breath.    Cardiovascular: Negative for chest pain and palpitations.   Gastrointestinal: Negative for abdominal distention, abdominal pain, constipation, diarrhea, nausea and vomiting.   Endocrine: Negative.    Genitourinary: Negative for dysuria, hematuria and urgency.   Musculoskeletal: Positive for arthralgias and gait problem. Negative for back pain, joint swelling and myalgias.   Skin: Negative for color change and rash.   Allergic/Immunologic: Negative.    Neurological: Negative for dizziness, weakness, light-headedness and headaches.   Hematological: Negative for adenopathy. Does not bruise/bleed easily.   Psychiatric/Behavioral: Positive for confusion and sleep disturbance. Negative for agitation and dysphoric mood. The patient is not nervous/anxious.        Visit Vitals  /80   Pulse 67   Temp 97.6 °F (36.4 °C)   Ht 182.9 cm (72\")   Wt 82.6 kg (182 lb)   SpO2 95%   BMI 24.68 kg/m²       Objective  Physical Exam "   Constitutional: He is oriented to person, place, and time. He appears well-developed and well-nourished. No distress.   HENT:   Nose: Nose normal.   Mouth/Throat: Oropharynx is clear and moist.   Eyes: Conjunctivae and EOM are normal. No scleral icterus.   Neck: No tracheal deviation present. No thyromegaly present.   Cardiovascular: Normal rate and regular rhythm.  Exam reveals no friction rub.    No murmur heard.  Pulmonary/Chest: No respiratory distress. He has no wheezes. He has no rales.   Abdominal: Soft. He exhibits no distension and no mass. There is no tenderness. There is no guarding.   Musculoskeletal: Normal range of motion. He exhibits deformity.   Lymphadenopathy:     He has no cervical adenopathy.   Neurological: He is alert and oriented to person, place, and time. He has normal reflexes. No cranial nerve deficit. Coordination normal.   Skin: Skin is warm and dry. Rash noted. There is erythema.   Soft subcutaneous tumor on upper back   Psychiatric: He has a normal mood and affect. His behavior is normal. Judgment and thought content normal.       Diagnoses and all orders for this visit:    Late onset Alzheimer's disease without behavioral disturbance. Behaviorally stable has recently good sleep hygiene    Ataxia. Rule out normal pressure hydrocephalus check routine CT of the brain along with routine lab work to rule out anemia dehydration further workup as needed.    Benign non-nodular prostatic hyperplasia with lower urinary tract symptoms stable with current meds

## 2018-08-14 LAB
ALBUMIN SERPL-MCNC: 3.5 G/DL (ref 3.5–5)
ALBUMIN/GLOB SERPL: 1.1 G/DL (ref 1–2)
ALP SERPL-CCNC: 74 U/L (ref 38–126)
ALT SERPL-CCNC: 21 U/L (ref 13–69)
AST SERPL-CCNC: 23 U/L (ref 15–46)
BILIRUB SERPL-MCNC: 0.4 MG/DL (ref 0.2–1.3)
BUN SERPL-MCNC: 25 MG/DL (ref 7–20)
BUN/CREAT SERPL: 22.7 (ref 6.3–21.9)
CALCIUM SERPL-MCNC: 9.1 MG/DL (ref 8.4–10.2)
CHLORIDE SERPL-SCNC: 103 MMOL/L (ref 98–107)
CO2 SERPL-SCNC: 26 MMOL/L (ref 26–30)
CREAT SERPL-MCNC: 1.1 MG/DL (ref 0.6–1.3)
ERYTHROCYTE [DISTWIDTH] IN BLOOD BY AUTOMATED COUNT: 12.7 % (ref 11.5–14.5)
GLOBULIN SER CALC-MCNC: 3.3 GM/DL
GLUCOSE SERPL-MCNC: 105 MG/DL (ref 74–98)
HCT VFR BLD AUTO: 39.5 % (ref 42–52)
HGB BLD-MCNC: 13.5 G/DL (ref 14–18)
MCH RBC QN AUTO: 30.5 PG (ref 27–31)
MCHC RBC AUTO-ENTMCNC: 34.2 G/DL (ref 30–37)
MCV RBC AUTO: 89.4 FL (ref 80–94)
PLATELET # BLD AUTO: 291 10*3/MM3 (ref 130–400)
POTASSIUM SERPL-SCNC: 4.4 MMOL/L (ref 3.5–5.1)
PROT SERPL-MCNC: 6.8 G/DL (ref 6.3–8.2)
RBC # BLD AUTO: 4.42 10*6/MM3 (ref 4.7–6.1)
SODIUM SERPL-SCNC: 140 MMOL/L (ref 137–145)
WBC # BLD AUTO: 6.18 10*3/MM3 (ref 4.8–10.8)

## 2018-08-16 ENCOUNTER — HOSPITAL ENCOUNTER (OUTPATIENT)
Dept: CT IMAGING | Facility: HOSPITAL | Age: 83
Discharge: HOME | End: 2018-08-16
Attending: INTERNAL MEDICINE | Admitting: INTERNAL MEDICINE

## 2018-08-16 DIAGNOSIS — G30.1 LATE ONSET ALZHEIMER'S DISEASE WITHOUT BEHAVIORAL DISTURBANCE (HCC): ICD-10-CM

## 2018-08-16 DIAGNOSIS — R27.0 ATAXIA: ICD-10-CM

## 2018-08-16 DIAGNOSIS — F02.80 LATE ONSET ALZHEIMER'S DISEASE WITHOUT BEHAVIORAL DISTURBANCE (HCC): ICD-10-CM

## 2018-08-16 PROCEDURE — 70450 CT HEAD/BRAIN W/O DYE: CPT

## 2018-08-20 RX ORDER — FINASTERIDE 5 MG/1
5 TABLET, FILM COATED ORAL DAILY
Qty: 90 TABLET | Refills: 3 | Status: SHIPPED | OUTPATIENT
Start: 2018-08-20

## 2018-08-27 ENCOUNTER — OUTSIDE FACILITY SERVICE (OUTPATIENT)
Dept: INTERNAL MEDICINE | Facility: CLINIC | Age: 83
End: 2018-08-27

## 2018-08-27 PROCEDURE — G0180 MD CERTIFICATION HHA PATIENT: HCPCS | Performed by: INTERNAL MEDICINE

## 2018-09-10 ENCOUNTER — TELEPHONE (OUTPATIENT)
Dept: INTERNAL MEDICINE | Facility: CLINIC | Age: 83
End: 2018-09-10

## 2018-09-10 RX ORDER — TAMSULOSIN HYDROCHLORIDE 0.4 MG/1
1 CAPSULE ORAL NIGHTLY
Qty: 90 CAPSULE | Refills: 3 | Status: SHIPPED | OUTPATIENT
Start: 2018-09-10

## 2018-11-06 ENCOUNTER — LAB REQUISITION (OUTPATIENT)
Dept: LAB | Facility: HOSPITAL | Age: 83
End: 2018-11-06

## 2018-11-06 DIAGNOSIS — N40.0 ENLARGED PROSTATE WITHOUT LOWER URINARY TRACT SYMPTOMS (LUTS): ICD-10-CM

## 2018-11-06 DIAGNOSIS — G30.1 ALZHEIMER'S DISEASE WITH LATE ONSET (CODE) (HCC): ICD-10-CM

## 2018-11-06 LAB
ALBUMIN SERPL-MCNC: 3.7 G/DL (ref 3.5–5)
ALBUMIN/GLOB SERPL: 1.1 G/DL (ref 1–2)
ALP SERPL-CCNC: 98 U/L (ref 38–126)
ALT SERPL W P-5'-P-CCNC: 31 U/L (ref 13–69)
ANION GAP SERPL CALCULATED.3IONS-SCNC: 13.4 MMOL/L (ref 10–20)
AST SERPL-CCNC: 26 U/L (ref 15–46)
BASOPHILS # BLD AUTO: 0.02 10*3/MM3 (ref 0–0.2)
BASOPHILS NFR BLD AUTO: 0.2 % (ref 0–2.5)
BILIRUB SERPL-MCNC: 0.3 MG/DL (ref 0.2–1.3)
BILIRUB UR QL STRIP: NEGATIVE
BUN BLD-MCNC: 21 MG/DL (ref 7–20)
BUN/CREAT SERPL: 15 (ref 6.3–21.9)
CALCIUM SPEC-SCNC: 9 MG/DL (ref 8.4–10.2)
CHLORIDE SERPL-SCNC: 101 MMOL/L (ref 98–107)
CLARITY UR: CLEAR
CO2 SERPL-SCNC: 25 MMOL/L (ref 26–30)
COLOR UR: YELLOW
CREAT BLD-MCNC: 1.4 MG/DL (ref 0.6–1.3)
DEPRECATED RDW RBC AUTO: 43 FL (ref 37–54)
EOSINOPHIL # BLD AUTO: 0.13 10*3/MM3 (ref 0–0.7)
EOSINOPHIL NFR BLD AUTO: 1.4 % (ref 0–7)
ERYTHROCYTE [DISTWIDTH] IN BLOOD BY AUTOMATED COUNT: 12.9 % (ref 11.5–14.5)
GFR SERPL CREATININE-BSD FRML MDRD: 48 ML/MIN/1.73
GLOBULIN UR ELPH-MCNC: 3.4 GM/DL
GLUCOSE BLD-MCNC: 109 MG/DL (ref 74–98)
GLUCOSE UR STRIP-MCNC: NEGATIVE MG/DL
HCT VFR BLD AUTO: 41.1 % (ref 42–52)
HGB BLD-MCNC: 13.8 G/DL (ref 14–18)
HGB UR QL STRIP.AUTO: NEGATIVE
IMM GRANULOCYTES # BLD: 0.08 10*3/MM3 (ref 0–0.06)
IMM GRANULOCYTES NFR BLD: 0.8 % (ref 0–0.6)
KETONES UR QL STRIP: NEGATIVE
LEUKOCYTE ESTERASE UR QL STRIP.AUTO: NEGATIVE
LYMPHOCYTES # BLD AUTO: 1.26 10*3/MM3 (ref 0.6–3.4)
LYMPHOCYTES NFR BLD AUTO: 13.4 % (ref 10–50)
MCH RBC QN AUTO: 30.7 PG (ref 27–31)
MCHC RBC AUTO-ENTMCNC: 33.6 G/DL (ref 30–37)
MCV RBC AUTO: 91.3 FL (ref 80–94)
MONOCYTES # BLD AUTO: 1.29 10*3/MM3 (ref 0–0.9)
MONOCYTES NFR BLD AUTO: 13.7 % (ref 0–12)
NEUTROPHILS # BLD AUTO: 6.64 10*3/MM3 (ref 2–6.9)
NEUTROPHILS NFR BLD AUTO: 70.5 % (ref 37–80)
NITRITE UR QL STRIP: NEGATIVE
NRBC BLD MANUAL-RTO: 0 /100 WBC (ref 0–0)
PH UR STRIP.AUTO: 6 [PH] (ref 5–8)
PLATELET # BLD AUTO: 336 10*3/MM3 (ref 130–400)
PMV BLD AUTO: 10.4 FL (ref 6–12)
POTASSIUM BLD-SCNC: 4.4 MMOL/L (ref 3.5–5.1)
PROT SERPL-MCNC: 7.1 G/DL (ref 6.3–8.2)
PROT UR QL STRIP: NEGATIVE
RBC # BLD AUTO: 4.5 10*6/MM3 (ref 4.7–6.1)
SODIUM BLD-SCNC: 135 MMOL/L (ref 137–145)
SP GR UR STRIP: 1.02 (ref 1–1.03)
UROBILINOGEN UR QL STRIP: NORMAL
WBC NRBC COR # BLD: 9.42 10*3/MM3 (ref 4.8–10.8)

## 2018-11-06 PROCEDURE — 85025 COMPLETE CBC W/AUTO DIFF WBC: CPT | Performed by: INTERNAL MEDICINE

## 2018-11-06 PROCEDURE — 80053 COMPREHEN METABOLIC PANEL: CPT | Performed by: INTERNAL MEDICINE

## 2018-11-06 PROCEDURE — 81003 URINALYSIS AUTO W/O SCOPE: CPT | Performed by: INTERNAL MEDICINE

## 2018-12-05 ENCOUNTER — HOSPITAL ENCOUNTER (EMERGENCY)
Facility: HOSPITAL | Age: 83
Discharge: HOME | End: 2018-12-05
Attending: STUDENT IN AN ORGANIZED HEALTH CARE EDUCATION/TRAINING PROGRAM | Admitting: STUDENT IN AN ORGANIZED HEALTH CARE EDUCATION/TRAINING PROGRAM

## 2018-12-05 ENCOUNTER — APPOINTMENT (OUTPATIENT)
Dept: GENERAL RADIOLOGY | Facility: HOSPITAL | Age: 83
End: 2018-12-05

## 2018-12-05 VITALS
HEIGHT: 73 IN | SYSTOLIC BLOOD PRESSURE: 150 MMHG | OXYGEN SATURATION: 98 % | HEART RATE: 64 BPM | TEMPERATURE: 97.9 F | RESPIRATION RATE: 18 BRPM | BODY MASS INDEX: 23.19 KG/M2 | DIASTOLIC BLOOD PRESSURE: 83 MMHG | WEIGHT: 175 LBS

## 2018-12-05 DIAGNOSIS — R07.9 CHEST PAIN, UNSPECIFIED TYPE: Primary | ICD-10-CM

## 2018-12-05 LAB
ALBUMIN SERPL-MCNC: 3.7 G/DL (ref 3.5–5)
ALBUMIN/GLOB SERPL: 1.2 G/DL (ref 1–2)
ALP SERPL-CCNC: 96 U/L (ref 38–126)
ALT SERPL W P-5'-P-CCNC: 35 U/L (ref 13–69)
ANION GAP SERPL CALCULATED.3IONS-SCNC: 8.1 MMOL/L (ref 10–20)
AST SERPL-CCNC: 28 U/L (ref 15–46)
BASOPHILS # BLD AUTO: 0.02 10*3/MM3 (ref 0–0.2)
BASOPHILS NFR BLD AUTO: 0.2 % (ref 0–2.5)
BILIRUB SERPL-MCNC: 0.3 MG/DL (ref 0.2–1.3)
BUN BLD-MCNC: 27 MG/DL (ref 7–20)
BUN/CREAT SERPL: 20.8 (ref 6.3–21.9)
CALCIUM SPEC-SCNC: 8.8 MG/DL (ref 8.4–10.2)
CHLORIDE SERPL-SCNC: 101 MMOL/L (ref 98–107)
CO2 SERPL-SCNC: 30 MMOL/L (ref 26–30)
CREAT BLD-MCNC: 1.3 MG/DL (ref 0.6–1.3)
DEPRECATED RDW RBC AUTO: 43.2 FL (ref 37–54)
EOSINOPHIL # BLD AUTO: 0.27 10*3/MM3 (ref 0–0.7)
EOSINOPHIL NFR BLD AUTO: 3.1 % (ref 0–7)
ERYTHROCYTE [DISTWIDTH] IN BLOOD BY AUTOMATED COUNT: 12.6 % (ref 11.5–14.5)
GFR SERPL CREATININE-BSD FRML MDRD: 53 ML/MIN/1.73
GLOBULIN UR ELPH-MCNC: 3.2 GM/DL
GLUCOSE BLD-MCNC: 102 MG/DL (ref 74–98)
HCT VFR BLD AUTO: 40 % (ref 42–52)
HGB BLD-MCNC: 13.2 G/DL (ref 14–18)
IMM GRANULOCYTES # BLD: 0.07 10*3/MM3 (ref 0–0.06)
IMM GRANULOCYTES NFR BLD: 0.8 % (ref 0–0.6)
LYMPHOCYTES # BLD AUTO: 0.99 10*3/MM3 (ref 0.6–3.4)
LYMPHOCYTES NFR BLD AUTO: 11.3 % (ref 10–50)
MCH RBC QN AUTO: 30.6 PG (ref 27–31)
MCHC RBC AUTO-ENTMCNC: 33 G/DL (ref 30–37)
MCV RBC AUTO: 92.8 FL (ref 80–94)
MONOCYTES # BLD AUTO: 1.39 10*3/MM3 (ref 0–0.9)
MONOCYTES NFR BLD AUTO: 15.8 % (ref 0–12)
NEUTROPHILS # BLD AUTO: 6.04 10*3/MM3 (ref 2–6.9)
NEUTROPHILS NFR BLD AUTO: 68.8 % (ref 37–80)
NRBC BLD MANUAL-RTO: 0 /100 WBC (ref 0–0)
PLATELET # BLD AUTO: 233 10*3/MM3 (ref 130–400)
PMV BLD AUTO: 9.7 FL (ref 6–12)
POTASSIUM BLD-SCNC: 4.1 MMOL/L (ref 3.5–5.1)
PROT SERPL-MCNC: 6.9 G/DL (ref 6.3–8.2)
RBC # BLD AUTO: 4.31 10*6/MM3 (ref 4.7–6.1)
SODIUM BLD-SCNC: 135 MMOL/L (ref 137–145)
TROPONIN I SERPL-MCNC: <0.012 NG/ML (ref 0–0.03)
TROPONIN I SERPL-MCNC: <0.012 NG/ML (ref 0–0.03)
WBC NRBC COR # BLD: 8.78 10*3/MM3 (ref 4.8–10.8)

## 2018-12-05 PROCEDURE — 80053 COMPREHEN METABOLIC PANEL: CPT | Performed by: STUDENT IN AN ORGANIZED HEALTH CARE EDUCATION/TRAINING PROGRAM

## 2018-12-05 PROCEDURE — 99284 EMERGENCY DEPT VISIT MOD MDM: CPT

## 2018-12-05 PROCEDURE — 71045 X-RAY EXAM CHEST 1 VIEW: CPT

## 2018-12-05 PROCEDURE — 93005 ELECTROCARDIOGRAM TRACING: CPT | Performed by: STUDENT IN AN ORGANIZED HEALTH CARE EDUCATION/TRAINING PROGRAM

## 2018-12-05 PROCEDURE — 84484 ASSAY OF TROPONIN QUANT: CPT | Performed by: STUDENT IN AN ORGANIZED HEALTH CARE EDUCATION/TRAINING PROGRAM

## 2018-12-05 PROCEDURE — 85025 COMPLETE CBC W/AUTO DIFF WBC: CPT | Performed by: STUDENT IN AN ORGANIZED HEALTH CARE EDUCATION/TRAINING PROGRAM

## 2018-12-05 NOTE — ED PROVIDER NOTES
Subjective   Demented pt who was sent in from a local nursing home because he complained of chest pain earlier today.  Currently denies chest pain, but states he as a mild ache between his shoulder blades.  Pt has dementia and can give no useful history.  No other history is known            Review of Systems   All other systems reviewed and are negative.      Past Medical History:   Diagnosis Date   • Alzheimer disease    • BPH (benign prostatic hyperplasia)    • Dementia    • Ingrown toenail    • Urinary tract infection        Allergies   Allergen Reactions   • Levofloxacin        Past Surgical History:   Procedure Laterality Date   • CATARACT EXTRACTION, BILATERAL         History reviewed. No pertinent family history.    Social History     Socioeconomic History   • Marital status:      Spouse name: Not on file   • Number of children: Not on file   • Years of education: Not on file   • Highest education level: Not on file   Tobacco Use   • Smoking status: Former Smoker   • Smokeless tobacco: Never Used   Substance and Sexual Activity   • Alcohol use: No     Comment: rarely   • Drug use: No           Objective   Physical Exam   Nursing note and vitals reviewed.    GEN: No acute distress  Head: Normocephalic, atraumatic  Eyes: Pupils equal round reactive to light  ENT: Posterior pharynx normal in appearance, oral mucosa is moist  Chest: Nontender to palpation  Cardiovascular: Regular rate  Lungs: Clear to auscultation bilaterally  Abdomen: Soft, nontender, nondistended, no peritoneal signs  Extremities: No edema, normal appearance  Neuro: Alert, oriented to self, follows commands and moves all 4 extremities in a coordinated manner  Psych: Pleasantly demented    Procedures           ED Course  ED Course as of Dec 05 0415   Wed Dec 05, 2018   0112 EKG shows sinus rhythm with a rate of 64.  Right ventricular conduction delay, nonspecific ST segments in leads V1 through V4.  Abnormal EKG.  Interpreted by me.   [DT]      ED Course User Index  [DT] Kory Bagley MD                  MDM  Number of Diagnoses or Management Options  Diagnosis management comments: Differential diagnosis for this patient would include acute coronary syndrome, pulmonary embolus, thoracic aortic dissection, pericarditis, pneumonia, pneumothorax, Boerhaave syndrome, or other concerns.  Patient's chest x-ray shows no evidence of infiltrate or pneumothorax with a normal mediastinum.  I doubt thoracic aortic dissection as the patient is in no distress whatsoever, along with a normal mediastinum on chest x-ray.  At this time the pain would be atypical for pulmonary embolus as it does not have a pleuritic component and is not present at this time. Serial troponins will be drawn to assess for acute MI.       Amount and/or Complexity of Data Reviewed  Decide to obtain previous medical records or to obtain history from someone other than the patient: yes  Obtain history from someone other than the patient: yes  Review and summarize past medical records: yes          Final diagnoses:   Chest pain, unspecified type            Kory Bagley MD  12/05/18 0416       Kory Bagley MD  12/27/18 0135

## 2019-05-31 ENCOUNTER — APPOINTMENT (OUTPATIENT)
Dept: CT IMAGING | Facility: HOSPITAL | Age: 84
End: 2019-05-31

## 2019-05-31 ENCOUNTER — HOSPITAL ENCOUNTER (EMERGENCY)
Facility: HOSPITAL | Age: 84
Discharge: HOME | End: 2019-05-31
Attending: EMERGENCY MEDICINE | Admitting: EMERGENCY MEDICINE

## 2019-05-31 VITALS
HEIGHT: 72 IN | HEART RATE: 58 BPM | DIASTOLIC BLOOD PRESSURE: 79 MMHG | OXYGEN SATURATION: 96 % | RESPIRATION RATE: 16 BRPM | TEMPERATURE: 97.6 F | BODY MASS INDEX: 21.4 KG/M2 | WEIGHT: 158 LBS | SYSTOLIC BLOOD PRESSURE: 159 MMHG
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
## 2019-05-31 DIAGNOSIS — S01.81XA FACIAL LACERATION, INITIAL ENCOUNTER: Primary | ICD-10-CM

## 2019-05-31 PROCEDURE — 70450 CT HEAD/BRAIN W/O DYE: CPT

## 2019-05-31 PROCEDURE — 99283 EMERGENCY DEPT VISIT LOW MDM: CPT

## 2019-07-17 ENCOUNTER — HOSPITAL ENCOUNTER (EMERGENCY)
Facility: HOSPITAL | Age: 84
Discharge: HOME | End: 2019-07-17
Attending: EMERGENCY MEDICINE | Admitting: EMERGENCY MEDICINE

## 2019-07-17 ENCOUNTER — APPOINTMENT (OUTPATIENT)
Dept: GENERAL RADIOLOGY | Facility: HOSPITAL | Age: 84
End: 2019-07-17

## 2019-07-17 VITALS
SYSTOLIC BLOOD PRESSURE: 129 MMHG | TEMPERATURE: 98.2 F | BODY MASS INDEX: 20.32 KG/M2 | OXYGEN SATURATION: 95 % | WEIGHT: 150 LBS | RESPIRATION RATE: 16 BRPM | DIASTOLIC BLOOD PRESSURE: 59 MMHG | HEIGHT: 72 IN | HEART RATE: 71 BPM

## 2019-07-17 DIAGNOSIS — N39.0 ACUTE UTI: Primary | ICD-10-CM

## 2019-07-17 LAB
ALBUMIN SERPL-MCNC: 3.3 G/DL (ref 3.5–5)
ALBUMIN/GLOB SERPL: 1 G/DL (ref 1–2)
ALP SERPL-CCNC: 84 U/L (ref 38–126)
ALT SERPL W P-5'-P-CCNC: 29 U/L (ref 13–69)
ANION GAP SERPL CALCULATED.3IONS-SCNC: 10.5 MMOL/L (ref 10–20)
AST SERPL-CCNC: 27 U/L (ref 15–46)
BACTERIA UR QL AUTO: ABNORMAL /HPF
BASOPHILS # BLD AUTO: 0.01 10*3/MM3 (ref 0–0.2)
BASOPHILS NFR BLD AUTO: 0.2 % (ref 0–1.5)
BILIRUB SERPL-MCNC: 0.4 MG/DL (ref 0.2–1.3)
BILIRUB UR QL STRIP: NEGATIVE
BUN BLD-MCNC: 36 MG/DL (ref 7–20)
BUN/CREAT SERPL: 24 (ref 6.3–21.9)
CALCIUM SPEC-SCNC: 8.1 MG/DL (ref 8.4–10.2)
CHLORIDE SERPL-SCNC: 100 MMOL/L (ref 98–107)
CLARITY UR: ABNORMAL
CO2 SERPL-SCNC: 28 MMOL/L (ref 26–30)
COLOR UR: YELLOW
CREAT BLD-MCNC: 1.5 MG/DL (ref 0.6–1.3)
DEPRECATED RDW RBC AUTO: 43.3 FL (ref 37–54)
EOSINOPHIL # BLD AUTO: 0.07 10*3/MM3 (ref 0–0.4)
EOSINOPHIL NFR BLD AUTO: 1.1 % (ref 0.3–6.2)
ERYTHROCYTE [DISTWIDTH] IN BLOOD BY AUTOMATED COUNT: 13 % (ref 12.3–15.4)
GFR SERPL CREATININE-BSD FRML MDRD: 44 ML/MIN/1.73
GLOBULIN UR ELPH-MCNC: 3.4 GM/DL
GLUCOSE BLD-MCNC: 115 MG/DL (ref 74–98)
GLUCOSE UR STRIP-MCNC: NEGATIVE MG/DL
HCT VFR BLD AUTO: 34.8 % (ref 37.5–51)
HGB BLD-MCNC: 11.7 G/DL (ref 13–17.7)
HGB UR QL STRIP.AUTO: ABNORMAL
HYALINE CASTS UR QL AUTO: ABNORMAL /LPF
IMM GRANULOCYTES # BLD AUTO: 0.06 10*3/MM3 (ref 0–0.05)
IMM GRANULOCYTES NFR BLD AUTO: 0.9 % (ref 0–0.5)
KETONES UR QL STRIP: NEGATIVE
LEUKOCYTE ESTERASE UR QL STRIP.AUTO: ABNORMAL
LYMPHOCYTES # BLD AUTO: 0.62 10*3/MM3 (ref 0.7–3.1)
LYMPHOCYTES NFR BLD AUTO: 9.3 % (ref 19.6–45.3)
MCH RBC QN AUTO: 30.6 PG (ref 26.6–33)
MCHC RBC AUTO-ENTMCNC: 33.6 G/DL (ref 31.5–35.7)
MCV RBC AUTO: 91.1 FL (ref 79–97)
MONOCYTES # BLD AUTO: 1.03 10*3/MM3 (ref 0.1–0.9)
MONOCYTES NFR BLD AUTO: 15.5 % (ref 5–12)
NEUTROPHILS # BLD AUTO: 4.85 10*3/MM3 (ref 1.7–7)
NEUTROPHILS NFR BLD AUTO: 73 % (ref 42.7–76)
NITRITE UR QL STRIP: NEGATIVE
NRBC BLD AUTO-RTO: 0 /100 WBC (ref 0–0.2)
PH UR STRIP.AUTO: 5.5 [PH] (ref 5–8)
PLATELET # BLD AUTO: 193 10*3/MM3 (ref 140–450)
PMV BLD AUTO: 10.1 FL (ref 6–12)
POTASSIUM BLD-SCNC: 4.5 MMOL/L (ref 3.5–5.1)
PROT SERPL-MCNC: 6.7 G/DL (ref 6.3–8.2)
PROT UR QL STRIP: ABNORMAL
RBC # BLD AUTO: 3.82 10*6/MM3 (ref 4.14–5.8)
RBC # UR: ABNORMAL /HPF
REF LAB TEST METHOD: ABNORMAL
SODIUM BLD-SCNC: 134 MMOL/L (ref 137–145)
SP GR UR STRIP: 1.02 (ref 1–1.03)
SQUAMOUS #/AREA URNS HPF: ABNORMAL /HPF
TROPONIN I SERPL-MCNC: <0.012 NG/ML (ref 0–0.03)
UROBILINOGEN UR QL STRIP: ABNORMAL
WBC NRBC COR # BLD: 6.64 10*3/MM3 (ref 3.4–10.8)
WBC UR QL AUTO: ABNORMAL /HPF

## 2019-07-17 PROCEDURE — 96365 THER/PROPH/DIAG IV INF INIT: CPT

## 2019-07-17 PROCEDURE — 25010000002 CEFTRIAXONE SODIUM-DEXTROSE 1-3.74 GM-%(50ML) RECONSTITUTED SOLUTION: Performed by: NURSE PRACTITIONER

## 2019-07-17 PROCEDURE — 84484 ASSAY OF TROPONIN QUANT: CPT | Performed by: NURSE PRACTITIONER

## 2019-07-17 PROCEDURE — 85025 COMPLETE CBC W/AUTO DIFF WBC: CPT | Performed by: NURSE PRACTITIONER

## 2019-07-17 PROCEDURE — 80053 COMPREHEN METABOLIC PANEL: CPT | Performed by: NURSE PRACTITIONER

## 2019-07-17 PROCEDURE — 73562 X-RAY EXAM OF KNEE 3: CPT

## 2019-07-17 PROCEDURE — 81001 URINALYSIS AUTO W/SCOPE: CPT | Performed by: NURSE PRACTITIONER

## 2019-07-17 PROCEDURE — 93005 ELECTROCARDIOGRAM TRACING: CPT | Performed by: NURSE PRACTITIONER

## 2019-07-17 PROCEDURE — 71046 X-RAY EXAM CHEST 2 VIEWS: CPT

## 2019-07-17 PROCEDURE — 99284 EMERGENCY DEPT VISIT MOD MDM: CPT

## 2019-07-17 RX ORDER — CEPHALEXIN 500 MG/1
500 CAPSULE ORAL 2 TIMES DAILY
Qty: 14 CAPSULE | Refills: 0 | Status: SHIPPED | OUTPATIENT
Start: 2019-07-17 | End: 2019-11-23 | Stop reason: HOSPADM

## 2019-07-17 RX ORDER — SODIUM CHLORIDE 0.9 % (FLUSH) 0.9 %
10 SYRINGE (ML) INJECTION AS NEEDED
Status: DISCONTINUED | OUTPATIENT
Start: 2019-07-17 | End: 2019-07-17 | Stop reason: HOSPADM

## 2019-07-17 RX ORDER — CEFTRIAXONE 1 G/50ML
1 INJECTION, SOLUTION INTRAVENOUS ONCE
Status: COMPLETED | OUTPATIENT
Start: 2019-07-17 | End: 2019-07-17

## 2019-07-17 RX ADMIN — CEFTRIAXONE 1 G: 1 INJECTION, SOLUTION INTRAVENOUS at 16:18

## 2019-07-17 RX ADMIN — SODIUM CHLORIDE 500 ML: 9 INJECTION, SOLUTION INTRAVENOUS at 14:57

## 2019-07-18 ENCOUNTER — PATIENT OUTREACH (OUTPATIENT)
Dept: CASE MANAGEMENT | Facility: OTHER | Age: 84
End: 2019-07-18

## 2019-07-18 NOTE — OUTREACH NOTE
Care Coordination Note    Call made to [facility] staff.  Confirmed patient is a Long Term Care resident at this facility.      Niya Darnell RN    7/18/2019, 8:53 AM

## 2019-07-23 ENCOUNTER — EPISODE CHANGES (OUTPATIENT)
Dept: CASE MANAGEMENT | Facility: OTHER | Age: 84
End: 2019-07-23

## 2019-07-27 ENCOUNTER — HOSPITAL ENCOUNTER (EMERGENCY)
Facility: HOSPITAL | Age: 84
Discharge: HOME | End: 2019-07-28
Attending: EMERGENCY MEDICINE | Admitting: EMERGENCY MEDICINE

## 2019-07-27 VITALS
OXYGEN SATURATION: 98 % | DIASTOLIC BLOOD PRESSURE: 78 MMHG | RESPIRATION RATE: 18 BRPM | BODY MASS INDEX: 22.35 KG/M2 | HEART RATE: 74 BPM | SYSTOLIC BLOOD PRESSURE: 161 MMHG | HEIGHT: 72 IN | WEIGHT: 165 LBS | TEMPERATURE: 97.8 F

## 2019-07-27 DIAGNOSIS — R46.89 AGGRESSIVE BEHAVIOR: Primary | ICD-10-CM

## 2019-07-27 LAB
ALBUMIN SERPL-MCNC: 3.7 G/DL (ref 3.5–5)
ALBUMIN/GLOB SERPL: 1 G/DL (ref 1–2)
ALP SERPL-CCNC: 117 U/L (ref 38–126)
ALT SERPL W P-5'-P-CCNC: 26 U/L (ref 13–69)
AMPHET+METHAMPHET UR QL: NEGATIVE
AMPHETAMINES UR QL: NEGATIVE
ANION GAP SERPL CALCULATED.3IONS-SCNC: 10.4 MMOL/L (ref 10–20)
APAP SERPL-MCNC: <10 MCG/ML (ref 10–30)
AST SERPL-CCNC: 27 U/L (ref 15–46)
BARBITURATES UR QL SCN: NEGATIVE
BASOPHILS # BLD AUTO: 0.05 10*3/MM3 (ref 0–0.2)
BASOPHILS NFR BLD AUTO: 0.5 % (ref 0–1.5)
BENZODIAZ UR QL SCN: NEGATIVE
BILIRUB SERPL-MCNC: 0.3 MG/DL (ref 0.2–1.3)
BILIRUB UR QL STRIP: NEGATIVE
BUN BLD-MCNC: 27 MG/DL (ref 7–20)
BUN/CREAT SERPL: 22.5 (ref 6.3–21.9)
BUPRENORPHINE SERPL-MCNC: NEGATIVE NG/ML
CALCIUM SPEC-SCNC: 8.7 MG/DL (ref 8.4–10.2)
CANNABINOIDS SERPL QL: NEGATIVE
CHLORIDE SERPL-SCNC: 101 MMOL/L (ref 98–107)
CLARITY UR: CLEAR
CO2 SERPL-SCNC: 29 MMOL/L (ref 26–30)
COCAINE UR QL: NEGATIVE
COLOR UR: YELLOW
CREAT BLD-MCNC: 1.2 MG/DL (ref 0.6–1.3)
DEPRECATED RDW RBC AUTO: 43.9 FL (ref 37–54)
EOSINOPHIL # BLD AUTO: 0.25 10*3/MM3 (ref 0–0.4)
EOSINOPHIL NFR BLD AUTO: 2.7 % (ref 0.3–6.2)
ERYTHROCYTE [DISTWIDTH] IN BLOOD BY AUTOMATED COUNT: 12.8 % (ref 12.3–15.4)
ETHANOL BLD-MCNC: <10 MG/DL
ETHANOL UR QL: <0.01 %
GFR SERPL CREATININE-BSD FRML MDRD: 58 ML/MIN/1.73
GLOBULIN UR ELPH-MCNC: 3.8 GM/DL
GLUCOSE BLD-MCNC: 91 MG/DL (ref 74–98)
GLUCOSE UR STRIP-MCNC: NEGATIVE MG/DL
HCT VFR BLD AUTO: 41 % (ref 37.5–51)
HGB BLD-MCNC: 13.2 G/DL (ref 13–17.7)
HGB UR QL STRIP.AUTO: NEGATIVE
IMM GRANULOCYTES # BLD AUTO: 0.16 10*3/MM3 (ref 0–0.05)
IMM GRANULOCYTES NFR BLD AUTO: 1.7 % (ref 0–0.5)
KETONES UR QL STRIP: NEGATIVE
LEUKOCYTE ESTERASE UR QL STRIP.AUTO: NEGATIVE
LYMPHOCYTES # BLD AUTO: 1.33 10*3/MM3 (ref 0.7–3.1)
LYMPHOCYTES NFR BLD AUTO: 14.4 % (ref 19.6–45.3)
MAGNESIUM SERPL-MCNC: 2.3 MG/DL (ref 1.6–2.3)
MCH RBC QN AUTO: 30.1 PG (ref 26.6–33)
MCHC RBC AUTO-ENTMCNC: 32.2 G/DL (ref 31.5–35.7)
MCV RBC AUTO: 93.4 FL (ref 79–97)
METHADONE UR QL SCN: NEGATIVE
MONOCYTES # BLD AUTO: 1.27 10*3/MM3 (ref 0.1–0.9)
MONOCYTES NFR BLD AUTO: 13.8 % (ref 5–12)
NEUTROPHILS # BLD AUTO: 6.15 10*3/MM3 (ref 1.7–7)
NEUTROPHILS NFR BLD AUTO: 66.9 % (ref 42.7–76)
NITRITE UR QL STRIP: NEGATIVE
NRBC BLD AUTO-RTO: 0 /100 WBC (ref 0–0.2)
OPIATES UR QL: NEGATIVE
OXYCODONE UR QL SCN: NEGATIVE
PCP UR QL SCN: NEGATIVE
PH UR STRIP.AUTO: 6.5 [PH] (ref 5–8)
PLATELET # BLD AUTO: 281 10*3/MM3 (ref 140–450)
PMV BLD AUTO: 9.8 FL (ref 6–12)
POTASSIUM BLD-SCNC: 4.4 MMOL/L (ref 3.5–5.1)
PROPOXYPH UR QL: NEGATIVE
PROT SERPL-MCNC: 7.5 G/DL (ref 6.3–8.2)
PROT UR QL STRIP: NEGATIVE
RBC # BLD AUTO: 4.39 10*6/MM3 (ref 4.14–5.8)
SALICYLATES SERPL-MCNC: <1 MG/DL (ref 2.8–20)
SODIUM BLD-SCNC: 136 MMOL/L (ref 137–145)
SP GR UR STRIP: 1.02 (ref 1–1.03)
TRICYCLICS UR QL SCN: NEGATIVE
UROBILINOGEN UR QL STRIP: NORMAL
WBC NRBC COR # BLD: 9.21 10*3/MM3 (ref 3.4–10.8)

## 2019-07-27 PROCEDURE — 80306 DRUG TEST PRSMV INSTRMNT: CPT | Performed by: EMERGENCY MEDICINE

## 2019-07-27 PROCEDURE — 80307 DRUG TEST PRSMV CHEM ANLYZR: CPT | Performed by: EMERGENCY MEDICINE

## 2019-07-27 PROCEDURE — 80053 COMPREHEN METABOLIC PANEL: CPT

## 2019-07-27 PROCEDURE — 99285 EMERGENCY DEPT VISIT HI MDM: CPT

## 2019-07-27 PROCEDURE — 85025 COMPLETE CBC W/AUTO DIFF WBC: CPT

## 2019-07-27 PROCEDURE — 83735 ASSAY OF MAGNESIUM: CPT | Performed by: EMERGENCY MEDICINE

## 2019-07-27 PROCEDURE — 93005 ELECTROCARDIOGRAM TRACING: CPT

## 2019-07-27 PROCEDURE — 36415 COLL VENOUS BLD VENIPUNCTURE: CPT

## 2019-07-27 PROCEDURE — 81003 URINALYSIS AUTO W/O SCOPE: CPT

## 2019-11-11 ENCOUNTER — TRANSCRIBE ORDERS (OUTPATIENT)
Dept: ADMINISTRATIVE | Facility: HOSPITAL | Age: 84
End: 2019-11-11

## 2019-11-11 DIAGNOSIS — R13.10 DYSPHAGIA, UNSPECIFIED TYPE: Primary | ICD-10-CM

## 2019-11-13 ENCOUNTER — APPOINTMENT (OUTPATIENT)
Dept: GENERAL RADIOLOGY | Facility: HOSPITAL | Age: 84
End: 2019-11-13

## 2019-11-20 ENCOUNTER — APPOINTMENT (OUTPATIENT)
Dept: GENERAL RADIOLOGY | Facility: HOSPITAL | Age: 84
End: 2019-11-20

## 2019-11-20 ENCOUNTER — HOSPITAL ENCOUNTER (INPATIENT)
Facility: HOSPITAL | Age: 84
LOS: 3 days | Discharge: NURSING, REHAB OR LONG-TERM CARE | End: 2019-11-23
Attending: EMERGENCY MEDICINE | Admitting: INTERNAL MEDICINE

## 2019-11-20 DIAGNOSIS — I21.4 NSTEMI (NON-ST ELEVATED MYOCARDIAL INFARCTION) (HCC): Primary | ICD-10-CM

## 2019-11-20 LAB
ALBUMIN SERPL-MCNC: 3.2 G/DL (ref 3.5–5.2)
ALBUMIN/GLOB SERPL: 0.8 G/DL
ALP SERPL-CCNC: 98 U/L (ref 39–117)
ALT SERPL W P-5'-P-CCNC: 10 U/L (ref 1–41)
ANION GAP SERPL CALCULATED.3IONS-SCNC: 12.2 MMOL/L (ref 5–15)
AST SERPL-CCNC: 20 U/L (ref 1–40)
BASOPHILS # BLD AUTO: 0.02 10*3/MM3 (ref 0–0.2)
BASOPHILS NFR BLD AUTO: 0.1 % (ref 0–1.5)
BILIRUB SERPL-MCNC: 0.2 MG/DL (ref 0.2–1.2)
BUN BLD-MCNC: 20 MG/DL (ref 8–23)
BUN/CREAT SERPL: 21.1 (ref 7–25)
CALCIUM SPEC-SCNC: 8.8 MG/DL (ref 8.6–10.5)
CHLORIDE SERPL-SCNC: 98 MMOL/L (ref 98–107)
CO2 SERPL-SCNC: 23.8 MMOL/L (ref 22–29)
CREAT BLD-MCNC: 0.95 MG/DL (ref 0.76–1.27)
DEPRECATED RDW RBC AUTO: 41.2 FL (ref 37–54)
EOSINOPHIL # BLD AUTO: 0 10*3/MM3 (ref 0–0.4)
EOSINOPHIL NFR BLD AUTO: 0 % (ref 0.3–6.2)
ERYTHROCYTE [DISTWIDTH] IN BLOOD BY AUTOMATED COUNT: 12.5 % (ref 12.3–15.4)
GFR SERPL CREATININE-BSD FRML MDRD: 75 ML/MIN/1.73
GLOBULIN UR ELPH-MCNC: 4.2 GM/DL
GLUCOSE BLD-MCNC: 146 MG/DL (ref 65–99)
HCT VFR BLD AUTO: 37.5 % (ref 37.5–51)
HGB BLD-MCNC: 13 G/DL (ref 13–17.7)
IMM GRANULOCYTES # BLD AUTO: 0.08 10*3/MM3 (ref 0–0.05)
IMM GRANULOCYTES NFR BLD AUTO: 0.6 % (ref 0–0.5)
LYMPHOCYTES # BLD AUTO: 0.54 10*3/MM3 (ref 0.7–3.1)
LYMPHOCYTES NFR BLD AUTO: 3.7 % (ref 19.6–45.3)
MCH RBC QN AUTO: 31.2 PG (ref 26.6–33)
MCHC RBC AUTO-ENTMCNC: 34.7 G/DL (ref 31.5–35.7)
MCV RBC AUTO: 89.9 FL (ref 79–97)
MONOCYTES # BLD AUTO: 0.66 10*3/MM3 (ref 0.1–0.9)
MONOCYTES NFR BLD AUTO: 4.6 % (ref 5–12)
NEUTROPHILS # BLD AUTO: 13.2 10*3/MM3 (ref 1.7–7)
NEUTROPHILS NFR BLD AUTO: 91 % (ref 42.7–76)
NRBC BLD AUTO-RTO: 0 /100 WBC (ref 0–0.2)
PLATELET # BLD AUTO: 386 10*3/MM3 (ref 140–450)
PMV BLD AUTO: 9.5 FL (ref 6–12)
POTASSIUM BLD-SCNC: 4.3 MMOL/L (ref 3.5–5.2)
PROT SERPL-MCNC: 7.4 G/DL (ref 6–8.5)
RBC # BLD AUTO: 4.17 10*6/MM3 (ref 4.14–5.8)
SODIUM BLD-SCNC: 134 MMOL/L (ref 136–145)
TROPONIN T SERPL-MCNC: 0.05 NG/ML (ref 0–0.03)
TROPONIN T SERPL-MCNC: 0.21 NG/ML (ref 0–0.03)
WBC NRBC COR # BLD: 14.5 10*3/MM3 (ref 3.4–10.8)

## 2019-11-20 PROCEDURE — 25010000002 MORPHINE PER 10 MG: Performed by: EMERGENCY MEDICINE

## 2019-11-20 PROCEDURE — 87081 CULTURE SCREEN ONLY: CPT | Performed by: FAMILY MEDICINE

## 2019-11-20 PROCEDURE — 99223 1ST HOSP IP/OBS HIGH 75: CPT | Performed by: FAMILY MEDICINE

## 2019-11-20 PROCEDURE — 99285 EMERGENCY DEPT VISIT HI MDM: CPT

## 2019-11-20 PROCEDURE — 84484 ASSAY OF TROPONIN QUANT: CPT | Performed by: PHYSICIAN ASSISTANT

## 2019-11-20 PROCEDURE — 85025 COMPLETE CBC W/AUTO DIFF WBC: CPT | Performed by: PHYSICIAN ASSISTANT

## 2019-11-20 PROCEDURE — 93005 ELECTROCARDIOGRAM TRACING: CPT | Performed by: PHYSICIAN ASSISTANT

## 2019-11-20 PROCEDURE — 84484 ASSAY OF TROPONIN QUANT: CPT | Performed by: FAMILY MEDICINE

## 2019-11-20 PROCEDURE — 80053 COMPREHEN METABOLIC PANEL: CPT | Performed by: PHYSICIAN ASSISTANT

## 2019-11-20 PROCEDURE — 25010000002 ENOXAPARIN PER 10 MG: Performed by: PHYSICIAN ASSISTANT

## 2019-11-20 PROCEDURE — 71045 X-RAY EXAM CHEST 1 VIEW: CPT

## 2019-11-20 RX ORDER — CHOLECALCIFEROL (VITAMIN D3) 125 MCG
10 CAPSULE ORAL NIGHTLY
COMMUNITY

## 2019-11-20 RX ORDER — ASPIRIN 325 MG
325 TABLET ORAL ONCE
Status: COMPLETED | OUTPATIENT
Start: 2019-11-20 | End: 2019-11-20

## 2019-11-20 RX ORDER — ONDANSETRON 4 MG/1
4 TABLET, FILM COATED ORAL EVERY 6 HOURS PRN
COMMUNITY

## 2019-11-20 RX ORDER — MORPHINE SULFATE 2 MG/ML
2 INJECTION, SOLUTION INTRAMUSCULAR; INTRAVENOUS EVERY 4 HOURS PRN
Status: ACTIVE | OUTPATIENT
Start: 2019-11-20 | End: 2019-11-21

## 2019-11-20 RX ORDER — SODIUM CHLORIDE 0.9 % (FLUSH) 0.9 %
10 SYRINGE (ML) INJECTION AS NEEDED
Status: DISCONTINUED | OUTPATIENT
Start: 2019-11-20 | End: 2019-11-23 | Stop reason: HOSPADM

## 2019-11-20 RX ORDER — SENNOSIDES 8.6 MG
650 CAPSULE ORAL EVERY 6 HOURS PRN
COMMUNITY

## 2019-11-20 RX ORDER — NALOXONE HCL 0.4 MG/ML
0.4 VIAL (ML) INJECTION
Status: DISCONTINUED | OUTPATIENT
Start: 2019-11-20 | End: 2019-11-23 | Stop reason: HOSPADM

## 2019-11-20 RX ORDER — NYSTATIN 100000 [USP'U]/G
1 POWDER TOPICAL AS NEEDED
COMMUNITY

## 2019-11-20 RX ORDER — ASPIRIN 81 MG/1
81 TABLET ORAL DAILY
Status: DISCONTINUED | OUTPATIENT
Start: 2019-11-21 | End: 2019-11-23 | Stop reason: HOSPADM

## 2019-11-20 RX ORDER — MORPHINE SULFATE 2 MG/ML
2 INJECTION, SOLUTION INTRAMUSCULAR; INTRAVENOUS ONCE
Status: COMPLETED | OUTPATIENT
Start: 2019-11-20 | End: 2019-11-20

## 2019-11-20 RX ORDER — FINASTERIDE 5 MG/1
5 TABLET, FILM COATED ORAL DAILY
Status: DISCONTINUED | OUTPATIENT
Start: 2019-11-21 | End: 2019-11-23 | Stop reason: HOSPADM

## 2019-11-20 RX ORDER — BENZONATATE 200 MG/1
200 CAPSULE ORAL 2 TIMES DAILY PRN
COMMUNITY
End: 2019-11-23 | Stop reason: HOSPADM

## 2019-11-20 RX ORDER — NITROGLYCERIN 0.4 MG/1
0.4 TABLET SUBLINGUAL
Status: DISCONTINUED | OUTPATIENT
Start: 2019-11-20 | End: 2019-11-23 | Stop reason: HOSPADM

## 2019-11-20 RX ORDER — SODIUM CHLORIDE 0.9 % (FLUSH) 0.9 %
10 SYRINGE (ML) INJECTION EVERY 12 HOURS SCHEDULED
Status: DISCONTINUED | OUTPATIENT
Start: 2019-11-20 | End: 2019-11-23 | Stop reason: HOSPADM

## 2019-11-20 RX ORDER — ATORVASTATIN CALCIUM 40 MG/1
40 TABLET, FILM COATED ORAL NIGHTLY
Status: DISCONTINUED | OUTPATIENT
Start: 2019-11-20 | End: 2019-11-23 | Stop reason: HOSPADM

## 2019-11-20 RX ORDER — CITALOPRAM 40 MG/1
40 TABLET ORAL NIGHTLY
COMMUNITY

## 2019-11-20 RX ORDER — POLYETHYLENE GLYCOL 3350 17 G/17G
17 POWDER, FOR SOLUTION ORAL DAILY
COMMUNITY

## 2019-11-20 RX ORDER — CALCIUM CARBONATE 200(500)MG
2 TABLET,CHEWABLE ORAL 4 TIMES DAILY PRN
COMMUNITY

## 2019-11-20 RX ORDER — RISPERIDONE 0.5 MG/1
0.5 TABLET ORAL NIGHTLY
COMMUNITY

## 2019-11-20 RX ORDER — IPRATROPIUM BROMIDE AND ALBUTEROL SULFATE 2.5; .5 MG/3ML; MG/3ML
3 SOLUTION RESPIRATORY (INHALATION) EVERY 6 HOURS PRN
COMMUNITY

## 2019-11-20 RX ADMIN — ASPIRIN 325 MG ORAL TABLET 325 MG: 325 PILL ORAL at 20:12

## 2019-11-20 RX ADMIN — MORPHINE SULFATE 2 MG: 2 INJECTION, SOLUTION INTRAMUSCULAR; INTRAVENOUS at 18:25

## 2019-11-20 RX ADMIN — SODIUM CHLORIDE, PRESERVATIVE FREE 10 ML: 5 INJECTION INTRAVENOUS at 23:00

## 2019-11-20 RX ADMIN — TICAGRELOR 180 MG: 90 TABLET ORAL at 20:12

## 2019-11-20 RX ADMIN — ENOXAPARIN SODIUM 90 MG: 100 INJECTION SUBCUTANEOUS at 20:12

## 2019-11-20 RX ADMIN — ATORVASTATIN CALCIUM 40 MG: 40 TABLET, FILM COATED ORAL at 22:42

## 2019-11-20 NOTE — ED NOTES
PT STATES HE IS HAVING CHEST AND BACK PAIN; NEW 12 LEAD CAPTURED AND GIVEN TO SABRINA.     Ev Ochoa, RN  11/20/19 1824

## 2019-11-21 ENCOUNTER — APPOINTMENT (OUTPATIENT)
Dept: CARDIOLOGY | Facility: HOSPITAL | Age: 84
End: 2019-11-21

## 2019-11-21 LAB
ANION GAP SERPL CALCULATED.3IONS-SCNC: 10.2 MMOL/L (ref 5–15)
BUN BLD-MCNC: 19 MG/DL (ref 8–23)
BUN/CREAT SERPL: 16.2 (ref 7–25)
CALCIUM SPEC-SCNC: 9.1 MG/DL (ref 8.6–10.5)
CHLORIDE SERPL-SCNC: 101 MMOL/L (ref 98–107)
CO2 SERPL-SCNC: 25.8 MMOL/L (ref 22–29)
CREAT BLD-MCNC: 1.17 MG/DL (ref 0.76–1.27)
DEPRECATED RDW RBC AUTO: 41.4 FL (ref 37–54)
ERYTHROCYTE [DISTWIDTH] IN BLOOD BY AUTOMATED COUNT: 12.5 % (ref 12.3–15.4)
GFR SERPL CREATININE-BSD FRML MDRD: 59 ML/MIN/1.73
GLUCOSE BLD-MCNC: 92 MG/DL (ref 65–99)
HCT VFR BLD AUTO: 39.4 % (ref 37.5–51)
HGB BLD-MCNC: 13.4 G/DL (ref 13–17.7)
LYMPHOCYTES # BLD MANUAL: 0.72 10*3/MM3 (ref 0.7–3.1)
LYMPHOCYTES NFR BLD MANUAL: 6 % (ref 19.6–45.3)
LYMPHOCYTES NFR BLD MANUAL: 7 % (ref 5–12)
MCH RBC QN AUTO: 31 PG (ref 26.6–33)
MCHC RBC AUTO-ENTMCNC: 34 G/DL (ref 31.5–35.7)
MCV RBC AUTO: 91.2 FL (ref 79–97)
METAMYELOCYTES NFR BLD MANUAL: 1 % (ref 0–0)
MONOCYTES # BLD AUTO: 0.83 10*3/MM3 (ref 0.1–0.9)
NEUTROPHILS # BLD AUTO: 10.25 10*3/MM3 (ref 1.7–7)
NEUTROPHILS NFR BLD MANUAL: 84 % (ref 42.7–76)
NEUTS BAND NFR BLD MANUAL: 2 % (ref 0–5)
PLATELET # BLD AUTO: 338 10*3/MM3 (ref 140–450)
PMV BLD AUTO: 9.6 FL (ref 6–12)
POTASSIUM BLD-SCNC: 3.9 MMOL/L (ref 3.5–5.2)
RBC # BLD AUTO: 4.32 10*6/MM3 (ref 4.14–5.8)
RBC MORPH BLD: NORMAL
SMALL PLATELETS BLD QL SMEAR: ADEQUATE
SODIUM BLD-SCNC: 137 MMOL/L (ref 136–145)
TROPONIN T SERPL-MCNC: 2.38 NG/ML (ref 0–0.03)
TROPONIN T SERPL-MCNC: 3.42 NG/ML (ref 0–0.03)
WBC MORPH BLD: NORMAL
WBC NRBC COR # BLD: 11.92 10*3/MM3 (ref 3.4–10.8)

## 2019-11-21 PROCEDURE — 80048 BASIC METABOLIC PNL TOTAL CA: CPT | Performed by: FAMILY MEDICINE

## 2019-11-21 PROCEDURE — 85027 COMPLETE CBC AUTOMATED: CPT | Performed by: FAMILY MEDICINE

## 2019-11-21 PROCEDURE — 93306 TTE W/DOPPLER COMPLETE: CPT

## 2019-11-21 PROCEDURE — 84484 ASSAY OF TROPONIN QUANT: CPT | Performed by: FAMILY MEDICINE

## 2019-11-21 PROCEDURE — 85007 BL SMEAR W/DIFF WBC COUNT: CPT | Performed by: FAMILY MEDICINE

## 2019-11-21 PROCEDURE — 99222 1ST HOSP IP/OBS MODERATE 55: CPT | Performed by: INTERNAL MEDICINE

## 2019-11-21 RX ORDER — ISOSORBIDE MONONITRATE 30 MG/1
30 TABLET, EXTENDED RELEASE ORAL
Status: DISCONTINUED | OUTPATIENT
Start: 2019-11-21 | End: 2019-11-23 | Stop reason: HOSPADM

## 2019-11-21 RX ORDER — LISINOPRIL 5 MG/1
5 TABLET ORAL
Status: DISCONTINUED | OUTPATIENT
Start: 2019-11-21 | End: 2019-11-23 | Stop reason: HOSPADM

## 2019-11-21 RX ADMIN — LISINOPRIL 5 MG: 5 TABLET ORAL at 09:15

## 2019-11-21 RX ADMIN — ISOSORBIDE MONONITRATE 30 MG: 30 TABLET, EXTENDED RELEASE ORAL at 09:14

## 2019-11-21 RX ADMIN — ASPIRIN 81 MG: 81 TABLET, COATED ORAL at 09:14

## 2019-11-21 RX ADMIN — FINASTERIDE 5 MG: 5 TABLET, FILM COATED ORAL at 09:15

## 2019-11-21 RX ADMIN — METOPROLOL TARTRATE 12.5 MG: 25 TABLET ORAL at 20:43

## 2019-11-21 RX ADMIN — TICAGRELOR 90 MG: 90 TABLET ORAL at 09:15

## 2019-11-21 RX ADMIN — SODIUM CHLORIDE, PRESERVATIVE FREE 10 ML: 5 INJECTION INTRAVENOUS at 20:50

## 2019-11-21 RX ADMIN — METOPROLOL TARTRATE 25 MG: 25 TABLET ORAL at 06:32

## 2019-11-21 RX ADMIN — TICAGRELOR 90 MG: 90 TABLET ORAL at 20:44

## 2019-11-21 RX ADMIN — ATORVASTATIN CALCIUM 40 MG: 40 TABLET, FILM COATED ORAL at 20:43

## 2019-11-21 NOTE — DISCHARGE PLACEMENT REQUEST
"PATRIA Jeffers RN  Case Management  Phone:  [phone]; Fax:  [phone]    Clinical updates.  Son (Enrique) states that they would like him to return as he was before the admission.    Sd Reza (85 y.o. Male)     Date of Birth Social Security Number Address Home Phone MRN    12/08/1933  [address] [place] [address] [phone] [id number]    Sabianist Marital Status          None        Admission Date Admission Type Admitting Provider Attending Provider Department, Room/Bed    11/20/19 Emergency Jes Roberts DO Shah, Gaurang B, MD [facility] SURG  3, 326/1    Discharge Date Discharge Disposition Discharge Destination                       Attending Provider:  Rudy Jay MD    Allergies:  Levofloxacin    Isolation:  None   Infection:  None   Code Status:  No CPR    Ht:  180.3 cm (71\")   Wt:  88.5 kg (195 lb 2 oz)    Admission Cmt:  None   Principal Problem:  None                Active Insurance as of 11/20/2019     Primary Coverage     Payor Plan Insurance Group Employer/Plan Group    MEDICARE MEDICARE A & B      Payor Plan Address Payor Plan Phone Number Payor Plan Fax Number Effective Dates    [address] [phone]  12/1/1998 - None Entered    [facility] [id number]       Subscriber Name Subscriber Birth Date Member ID       SD REZA 12/8/1933 [id number]           Secondary Coverage     Payor Plan Insurance Group Employer/Plan Group    [employer] SUP AAR HEALTH CARE OPTIONS      Payor Plan Address Payor Plan Phone Number Payor Plan Fax Number Effective Dates    [facility] [phone]  1/1/2016 - None Entered    [address]       [facility] [address]       Subscriber Name Subscriber Birth Date Member ID       SD REZA 12/8/1933 [id number]                 Emergency Contacts      (Rel.) Home Phone Work Phone Mobile Phone    Raegan Reza (Spouse) [phone] -- --               History & Physical    "   Jes Roberts, DO at 19              [facility]   HISTORY AND PHYSICAL      Name:  Sd Anderson   Age:  85 y.o.  Sex:  male  :  1933  MRN:  [id number]   Visit Number:  [id number]  Admission Date:  2019  Date Of Service:  19  Primary Care Physician:  Andrey Jade MD    Chief Complaint:     Chest pain, dyspnea    History Of Presenting Illness:      Patient is a 85-year-old gentleman with history of severe Alzheimer's dementia who presented to the emergency room today with complaints of chest pain.  Medical history significant for Alzheimer's, BPH, history of UTI.  Patient is accompanied by wife who is primary historian.  She states that patient lives at [facility].  States that approximately at noon today patient had complained of left-sided chest pain.  States he had some shortness of breath with this.  Was very uncomfortable.  She states he has had no other complaints.  No falls or injuries.  No recent travel.  No cough or palpitations.  No fevers or chills.  Denies any prior cardiac disease.  No history of hypertension, tobacco abuse, alcohol use.  She states she has significant dementia from Alzheimer's and this has been progressively worse over the last 3 years.  She notes his chest pain seemed to resolve at about 4 PM.  Had taken aspirin.    In the ER, patient's vitals show temp 98.4, heart rate 97, respirate 16, /85 with 94% oxygen sats on room air.  CBC white blood cell count 14,000, initial troponin 0 0.053, repeat 0.207.  CMP with creatinine 0.95.  Patient was given full dose aspirin, morphine.  His case was discussed with cardiology and patient's wife.  Recommended medical management versus invasive strategy.  We were asked to admit for further work-up.    Review Of Systems:     General: Denies any fevers, chills or loss of consciousness.   Psych: No history of any hallucinations and delusions.  Ophth: No history of any diplopia  or transient loss of vision.  ENT: No history of sore throat, nasal congestion or ear pain.   Allergy/immuno: No history of rash or itching.  Hem/lymph: No history of neck swelling or easy bleeding.  Endo: No history of any recent unintentional weight gain or loss.  Resp: No history of cough, positive dyspnea  Card: Positive chest pain  GI: No history of nausea, vomiting, diarrhea. Denies any abdominal pain.   : No history of dysuria or hematuria.  MSK: No muscle pain. No calf pain.   Neuro: No history of any focal weakness. No loss of consciousness. Denies any numbness.  Derm:: No history of any redness or pruritis.     Past Medical History:    Past Medical History:   Diagnosis Date   • Alzheimer disease (CMS/HCC)    • BPH (benign prostatic hyperplasia)    • Dementia (CMS/HCC)    • Ingrown toenail    • Urinary tract infection        Past Surgical history:    Past Surgical History:   Procedure Laterality Date   • CATARACT EXTRACTION, BILATERAL         Social History:    Social History     Socioeconomic History   • Marital status:      Spouse name: Not on file   • Number of children: Not on file   • Years of education: Not on file   • Highest education level: Not on file   Tobacco Use   • Smoking status: Former Smoker   • Smokeless tobacco: Never Used   Substance and Sexual Activity   • Alcohol use: No     Comment: rarely   • Drug use: No       Family History:    History reviewed. No pertinent family history.    Allergies:      Levofloxacin    Home Medications:    Prior to Admission Medications     Prescriptions Last Dose Informant Patient Reported? Taking?    cephalexin (KEFLEX) 500 MG capsule   No No    Take 1 capsule by mouth 2 (Two) Times a Day.    finasteride (PROSCAR) 5 MG tablet   No No    Take 1 tablet by mouth Daily.    latanoprost (XALATAN) 0.005 % ophthalmic solution   Yes No    Apply  to eye.    tamsulosin (FLOMAX) 0.4 MG capsule 24 hr capsule   No No    Take 1 capsule by mouth Every Night.              ED Medications:    Medications   sodium chloride 0.9 % flush 10 mL (not administered)   Morphine sulfate (PF) injection 2 mg (2 mg Intravenous Given 11/20/19 1825)   ticagrelor (BRILINTA) tablet 180 mg (180 mg Oral Given 11/20/19 2012)   aspirin tablet 325 mg (325 mg Oral Given 11/20/19 2012)   enoxaparin (LOVENOX) syringe 90 mg (90 mg Subcutaneous Given 11/20/19 2012)       Vital Signs:    Temp:  [98.4 °F (36.9 °C)] 98.4 °F (36.9 °C)  Heart Rate:  [80-98] 94  Resp:  [16] 16  BP: (121-158)/(69-86) 158/85        11/20/19  1611   Weight: 88.5 kg (195 lb 2 oz)       Body mass index is 27.21 kg/m².    Physical Exam:    General Appearance:  Alert and cooperative, not in any acute distress.   Head:  Atraumatic and normocephalic, without obvious abnormality.   Eyes:          PERRLA, conjunctivae and sclerae normal, no Icterus. No pallor. Extraocular movements are within normal limits.   Ears:  Ears appear intact with no abnormalities noted.   Throat: No oral lesions, no thrush, oral mucosa moist.   Neck: Supple, trachea midline, no thyromegaly, no carotid bruit.   Back:   No tenderness to palpation, range of motion normal.   Lungs:   Breath sounds heard bilaterally equally.  No crackles or wheezing. No Pleural rub or bronchial breathing.   Heart:   Tachycardic normal S1 and S2, no murmur, no gallop, no rub. No JVD.   Abdomen:   Normal bowel sounds, no masses, no organomegaly. Soft, non-tender, non-distended, no guarding, no rebound tenderness.   Extremities: Moves all extremities well, no edema, no cyanosis, no clubbing.   Pulses: Pulses palpable and equal bilaterally.   Skin: No bleeding, bruising or rash.   Neurologic:  Alert, oriented to person and place.  Slow movements noted.  No tremors.  Overall strength decreased.no focal deficits.     Laboratory data:    I have reviewed the labs done in the emergency room.    Results from last 7 days   Lab Units 11/20/19  1652   SODIUM mmol/L 134*   POTASSIUM mmol/L 4.3    CHLORIDE mmol/L 98   CO2 mmol/L 23.8   BUN mg/dL 20   CREATININE mg/dL 0.95   CALCIUM mg/dL 8.8   BILIRUBIN mg/dL 0.2   ALK PHOS U/L 98   ALT (SGPT) U/L 10   AST (SGOT) U/L 20   GLUCOSE mg/dL 146*     Results from last 7 days   Lab Units 11/20/19  1652   WBC 10*3/mm3 14.50*   HEMOGLOBIN g/dL 13.0   HEMATOCRIT % 37.5   PLATELETS 10*3/mm3 386         Results from last 7 days   Lab Units 11/20/19  1906 11/20/19  1652   TROPONIN T ng/mL 0.207* 0.053*                           Invalid input(s): USDES,  BLOODU, NITRITITE, BACT, EP  Pain Management Panel     Pain Management Panel Latest Ref Rng & Units 7/27/2019    AMPHETAMINES SCREEN, URINE Negative Negative    BARBITURATES SCREEN Negative Negative    BENZODIAZEPINE SCREEN, URINE Negative Negative    BUPRENORPHINEUR Negative Negative    COCAINE SCREEN, URINE Negative Negative    METHADONE SCREEN, URINE Negative Negative    METHAMPHETAMINEUR Negative Negative              EKG:      EKG with sinus rhythm, heart rate of 97, there is a right bundle branch block.  No obvious ST elevation.    Radiology:    Imaging Results (Last 72 Hours)     Procedure Component Value Units Date/Time    XR Chest 1 View [[id number]] Updated:  11/20/19 1704      Chest x-ray reviewed by myself      NSTEMI (non-ST elevated myocardial infarction) (CMS/[facility])      Assessment:    1.  NSTEMI, present on admission, acute  2.  Elevated troponin, present on admission, acute, likely secondary to #1  3.  Advanced Alzheimer's dementia, present on admission, chronic  4.  Debility, present on admission, chronic  5.  BPH, present admission, chronic    Plan:    Unfortunate 85-year-old gentleman with long-standing Alzheimer's dementia with evidence of acute coronary syndrome.  I reiterated the plan of care with the patient and his wife at bedside.  They would not like to proceed with an invasive strategy and instead opted for medical management and comfort.  Cardiology has been consulted, patient has been placed on  Lovenox, Brilinta, and aspirin.  I am going to order a 2D echo, trend troponins, and repeat labs in the morning.  Have added beta-blocker and statin therapy.  PRN nitroglycerin and morphine.  Be monitored on telemetry.    I am going to consult palliative care to discuss goals of care.  Wife mentioned to me about potential hospice and advised will have palliative nurse to discuss goals of care with him.    Patient otherwise meets inpatient level of care secondary to acute coronary syndrome and need for further medical management and work-up.  Anticipate greater than 2 midnight stay.  Plan of care discussed with patient, wife, for which they are agreeable.    Jes Roberts DO  11/20/19  8:45 PM    Dictated utilizing Dragon dictation.    Electronically signed by Jes Roberts DO at 11/20/19 4541       [facility] Medications (active)       Dose Frequency Start End    aspirin EC tablet 81 mg 81 mg Daily 11/21/2019     Sig - Route: Take 1 tablet by mouth Daily. - Oral    aspirin tablet 325 mg 325 mg Once 11/20/2019 11/20/2019    Sig - Route: Take 1 tablet by mouth 1 (One) Time. - Oral    Cosign for Ordering: Required by Bryson Ramirez DO    atorvastatin (LIPITOR) tablet 40 mg 40 mg Nightly 11/20/2019     Sig - Route: Take 1 tablet by mouth Every Night. - Oral    enoxaparin (LOVENOX) syringe 90 mg 1 mg/kg × 88.5 kg Once 11/20/2019 11/20/2019    Sig - Route: Inject 0.9 mL under the skin into the appropriate area as directed 1 (One) Time. - Subcutaneous    Cosign for Ordering: Required by Bryson Ramirez DO    finasteride (PROSCAR) tablet 5 mg 5 mg Daily 11/21/2019     Sig - Route: Take 1 tablet by mouth Daily. - Oral    isosorbide mononitrate (IMDUR) 24 hr tablet 30 mg 30 mg Every 24 Hours Scheduled 11/21/2019     Sig - Route: Take 1 tablet by mouth Daily. - Oral    lisinopril (PRINIVIL,ZESTRIL) tablet 5 mg 5 mg Every 24 Hours Scheduled 11/21/2019     Sig - Route: Take 1 tablet by mouth Daily. - Oral     "metoprolol tartrate (LOPRESSOR) tablet 12.5 mg 12.5 mg Every 12 Hours Scheduled 11/21/2019     Sig - Route: Take 0.5 tablets by mouth Every 12 (Twelve) Hours. - Oral    Morphine sulfate (PF) injection 2 mg 2 mg Once 11/20/2019 11/20/2019    Sig - Route: Infuse 1 mL into a venous catheter 1 (One) Time. - Intravenous    Morphine sulfate (PF) injection 2 mg 2 mg Every 4 Hours PRN 11/20/2019 11/21/2019    Sig - Route: Infuse 1 mL into a venous catheter Every 4 (Four) Hours As Needed for Moderate Pain . - Intravenous    Linked Group 1:  \"And\" Linked Group Details        naloxone (NARCAN) injection 0.4 mg 0.4 mg Every 5 Minutes PRN 11/20/2019     Sig - Route: Infuse 1 mL into a venous catheter Every 5 (Five) Minutes As Needed for Respiratory Depression. - Intravenous    Linked Group 1:  \"And\" Linked Group Details        nitroglycerin (NITROSTAT) SL tablet 0.4 mg 0.4 mg Every 5 Minutes PRN 11/20/2019     Sig - Route: Place 1 tablet under the tongue Every 5 (Five) Minutes As Needed for Chest Pain. - Sublingual    sodium chloride 0.9 % flush 10 mL 10 mL As Needed 11/20/2019     Sig - Route: Infuse 10 mL into a venous catheter As Needed for Line Care. - Intravenous    Cosign for Ordering: Accepted by Bryson Ramirez DO on 11/20/2019  5:05 PM    Linked Group 2:  \"And\" Linked Group Details        sodium chloride 0.9 % flush 10 mL 10 mL Every 12 Hours Scheduled 11/20/2019     Sig - Route: Infuse 10 mL into a venous catheter Every 12 (Twelve) Hours. - Intravenous    sodium chloride 0.9 % flush 10 mL 10 mL As Needed 11/20/2019     Sig - Route: Infuse 10 mL into a venous catheter As Needed for Line Care. - Intravenous    ticagrelor (BRILINTA) tablet 180 mg 180 mg Once 11/20/2019 11/20/2019    Sig - Route: Take 2 tablets by mouth 1 (One) Time. - Oral    Cosign for Ordering: Required by Bryson Ramirez DO    ticagrelor (BRILINTA) tablet 90 mg 90 mg 2 Times Daily 11/21/2019     Sig - Route: Take 1 tablet by mouth 2 (Two) Times a Day. " - Oral    metoprolol tartrate (LOPRESSOR) tablet 25 mg (Discontinued) 25 mg Every 12 Hours Scheduled 2019    Sig - Route: Take 1 tablet by mouth Every 12 (Twelve) Hours. - Oral             Physician Progress Notes (last 24 hours) (Notes from 19 1643 through 19 1643)      Rudy Jay MD at 19 0845                [facility]    [facility]    Name:  Sd Anderson   Age:  85 y.o.  Sex:  male  :  1933  MRN:  [id number]   Visit Number:  [id number]  Admission Date:  2019  Date Of Service:  19  Primary Care Physician:  Rudy Jay MD     LOS: 1 day :  Patient Care Team:  Rudy Jay MD as PCP - General (Internal Medicine)  Alexandria Rivero MD as PCP - Claims Attributed:      Subjective / Interval History:     85-year-old patient of mine who is at [facility] was admitted yesterday because of non-STEMI.  He did have chest pain initially did not want to go to the ER but family did agree and came to the ER.  His initial troponin was 0.5 and it did go up to 2 and above.  Patient is symptom-free currently on medical management and the family wants and no invasive procedure and just conservative medical management with dramatic management which is being addressed.  Overnight patient has not had any recurrent chest pain and has been feeling better.  He has not much appetite denies any nausea vomiting denies any shortness of breath.  He does have dementia and is a very poor historian       Vital Signs:    Temp:  [97.5 °F (36.4 °C)-98.4 °F (36.9 °C)] 97.7 °F (36.5 °C)  Heart Rate:  [52-98] 52  Resp:  [16-18] 18  BP: (121-171)/(69-86) 138/81    Intake and output:    No intake/output data recorded.  No intake/output data recorded.    Physical Examination:    General Appearance:    Alert and cooperative, not in any acute distress.   Head:    Atraumatic and normocephalic, without obvious abnormality.   Eyes:            PERRLA,  No  pallor. Extraocular movements are within normal limits.   Neck:   Supple,  No lymph glands, no bruit   Lungs:     Chest shape is normal. Breath sounds heard bilaterally equally.  No crackles or wheezing.     Heart:    Normal S1 and S2, no murmur,  No JVD   Abdomen:     Normal bowel sounds, no masses, no organomegaly. Soft        non-tender, no guarding, no rebound tenderness   Extremities:   Moves all extremities well, no edema, no cyanosis,    Skin:   No  bruising or rash.   Neurologic:   Grossly non focal and moves all extrimities.  He does have dementia so Gabi evaluation is difficult   Laboratory results:  Results from last 7 days   Lab Units 11/21/19  0607 11/20/19  1652   SODIUM mmol/L 137 134*   POTASSIUM mmol/L 3.9 4.3   CHLORIDE mmol/L 101 98   CO2 mmol/L 25.8 23.8   BUN mg/dL 19 20   CREATININE mg/dL 1.17 0.95   CALCIUM mg/dL 9.1 8.8   BILIRUBIN mg/dL  --  0.2   ALK PHOS U/L  --  98   ALT (SGPT) U/L  --  10   AST (SGOT) U/L  --  20   GLUCOSE mg/dL 92 146*     Results from last 7 days   Lab Units 11/21/19  0607 11/20/19  1652   WBC 10*3/mm3 11.92* 14.50*   HEMOGLOBIN g/dL 13.4 13.0   HEMATOCRIT % 39.4 37.5   PLATELETS 10*3/mm3 338 386         Results from last 7 days   Lab Units 11/21/19  0607 11/20/19  2344 11/20/19  1906   TROPONIN T ng/mL 3.420* 2.380* 0.207*           Radiology results:    Imaging Results (Last 24 Hours)     Procedure Component Value Units Date/Time    XR Chest 1 View [[id number]] Updated:  11/20/19 6359          I have reviewed the patient's radiology reports.    Medication Review:     I have reviewed the patients active and prn medications.     Assessment:      NSTEMI (non-ST elevated myocardial infarction) (CMS/HCC)  Alzheimer's dementia  BPH  Hypertension    Plan:    1.  Non-STEMI patient presented with a typical type I MI with non-STEMI and the patient and the family wants only medical management and only conservative treatment and no invasive procedures.  Patient is DNR and no  intubation and is very clear with goals of care along with the patient and the wife and the son had a lengthy discussion and about advanced directives and do not want CPR or intubation or any life-sustaining measures.  Patient is stable on medical management at present with present aspirin and statins and beta-blocker.  Also the nitro will be given and morphine if needed for pain.  Will monitor closely for post MI arrhythmias and recommended observation for the next 48 to 72 hours and mode of management and changes to be done accordingly.  2.  Hypertension-he is on beta-blocker and ACE inhibitor and pressures have improved  3.  BPH he is on Proscar and is stable at present which will be continued  Continue rest of the medication as ordered and family with the wife and the son is aware of the plan of care and anticipated hospital stay would be 2 to 3 days.    Rudy Jay MD  11/21/19  8:45 AM      Please note that portions of this note may have been completed with a voice recognition program. Efforts were made to edit the dictations, but occasionally words are mistranscribed.      Electronically signed by Rudy Jay MD at 11/21/19 4889       Physical Therapy Notes (last 24 hours) (Notes from 11/20/19 1643 through 11/21/19 1643)     No notes of this type exist for this encounter.        Occupational Therapy Notes (last 24 hours) (Notes from 11/20/19 1643 through 11/21/19 1643)     No notes of this type exist for this encounter.

## 2019-11-21 NOTE — PLAN OF CARE
Problem: Patient Care Overview  Goal: Plan of Care Review  Outcome: Ongoing (interventions implemented as appropriate)   11/21/19 0147   Coping/Psychosocial   Plan of Care Reviewed With patient   Plan of Care Review   Progress no change   OTHER   Outcome Summary VSS on room air. New admit from ER. No compliants of chest pain. Troponin trending up. Will continue to monitor.        Problem: Fall Risk (Adult)  Goal: Identify Related Risk Factors and Signs and Symptoms  Outcome: Ongoing (interventions implemented as appropriate)   11/21/19 0147   Fall Risk (Adult)   Related Risk Factors (Fall Risk) age-related changes;confusion/agitation;gait/mobility problems;history of falls;environment unfamiliar   Signs and Symptoms (Fall Risk) presence of risk factors     Goal: Absence of Fall  Outcome: Ongoing (interventions implemented as appropriate)   11/21/19 0147   Fall Risk (Adult)   Absence of Fall making progress toward outcome       Problem: Skin Injury Risk (Adult)  Goal: Identify Related Risk Factors and Signs and Symptoms  Outcome: Ongoing (interventions implemented as appropriate)   11/21/19 0147   Skin Injury Risk (Adult)   Related Risk Factors (Skin Injury Risk) advanced age;cognitive impairment;mobility impaired;moisture     Goal: Skin Health and Integrity  Outcome: Ongoing (interventions implemented as appropriate)   11/21/19 0147   Skin Injury Risk (Adult)   Skin Health and Integrity making progress toward outcome      11/21/19 0147   Skin Injury Risk (Adult)   Skin Health and Integrity making progress toward outcome

## 2019-11-21 NOTE — PROGRESS NOTES
Adult Nutrition  Assessment/PES    Patient Name:  Sd Anderson  YOB: 1933  MRN: [id number]  Admit Date:  11/20/2019    Assessment Date:  11/21/2019    Comments:    Recommend:  1. Consider adding Cardiac diet modification to current diet order as medically appropriate and tolerated.  2. Encourage PO intake. PO intake average ~25% x 1 meal.  3. RD ordered Boost Plus daily with breakfast.  4. Consider MVI with minerals daily.     RD to follow pt and available PRN.      Reason for Assessment     Row Name 11/21/19 1404          Reason for Assessment    Reason For Assessment  diagnosis/disease state     Diagnosis  cardiac disease;neurologic conditions Alzheimer's Dementia, NSTEMI     Identified At Risk by Screening Criteria  MST SCORE 2+             Labs/Tests/Procedures/Meds     Row Name 11/21/19 1408          Labs/Procedures/Meds    Lab Results Reviewed  reviewed, pertinent     Lab Results Comments  Low: Alb        Medications    Pertinent Medications Reviewed  reviewed         Physical Findings     Row Name 11/21/19 1403          Physical Findings    Overall Physical Appearance  overweight         Estimated/Assessed Needs     Row Name 11/21/19 1405          Calculation Measurements    Weight Used For Calculations  88.5 kg (195 lb 1.7 oz) Actual BW        Estimated/Assessed Needs    Additional Documentation  Fluid Requirements (Group);[place]-St. Jeor Equation (Group);Protein Requirements (Group);Calorie Requirements (Group)        Calorie Requirements    Estimated Calorie Need Method  [place]-St Jeor     Estimated Calorie Requirement Comment  2070 - 2270 AF 1.3        [place]-St. Jeor Equation    RMR ([place]-St. Jeor Equation)  1592.125        Protein Requirements    Weight Used For Protein Calculations  88.5 kg (195 lb 1.7 oz) Actual BW     Est Protein Requirement Amount (gms/kg)  1.2 gm protein 89 - 106 gm     Estimated Protein Requirements (gms/day)  106.2        Fluid Requirements    Estimated  Fluid Requirement Method  Kayleen-Segar Formula     Kayleen-Segar Method (over 20 kg)  3270         Nutrition Prescription Ordered     Row Name 11/21/19 1406          Nutrition Prescription PO    Current PO Diet  Regular         Evaluation of Received Nutrient/Fluid Intake     Row Name 11/21/19 1405          PO Evaluation    Number of Days PO Intake Evaluated  2 days     Number of Meals  1     % PO Intake  25               Problem/Interventions:  Problem 1     Row Name 11/21/19 1407          Nutrition Diagnoses Problem 1    Problem 1  Predicted Suboptimal Intake     Etiology (related to)  Factors Affecting Nutrition     Food Habit/Preferences  Small Meals     Signs/Symptoms (evidenced by)  PO Intake     Percent (%) intake recorded  25 %     Over number of meals  1               Intervention Goal     Row Name 11/21/19 1408          Intervention Goal    General  Meet nutritional needs for age/condition     PO  Meet estimated needs;Increase intake;PO intake (%)     PO Intake %  50 %     Weight  Maintain weight         Nutrition Intervention     Row Name 11/21/19 1408          Nutrition Intervention    RD/Tech Action  Follow Tx progress;Encourage intake;Recommend/ordered     Recommended/Ordered  Supplement         Nutrition Prescription     Row Name 11/21/19 1408          Nutrition Prescription PO    PO Prescription  Begin/change diet;Begin/change supplement     Begin/Change Diet to  Regular     Supplement  Boost Plus     Supplement Frequency  Daily     Common Modifiers  Cardiac     New PO Prescription Ordered?  No, recommended        Other Orders    Obtain Weight  Daily     Obtain Weight Ordered?  No, recommended     Supplement  Vitamin mineral supplement     Supplement Ordered?  No, recommended         Education/Evaluation     Row Name 11/21/19 1401          Education    Education  Education not appropriate at this time     Please explain  Patient confusion;Other (comment) Sukhdev Resident        Monitor/Evaluation     Monitor  Per protocol;I&O;PO intake;Supplement intake;Pertinent labs;Weight;Skin status           Electronically signed by:  Carolynn Collier RD  11/21/19 2:10 PM

## 2019-11-21 NOTE — ED NOTES
House Supervisor, Niya, assigned pt bed 326. [facility], notified.     Monie Rabago  11/20/19 2019

## 2019-11-21 NOTE — PROGRESS NOTES
[facility]    Name:  Sd Anderson   Age:  85 y.o.  Sex:  male  :  1933  MRN:  [id number]   Visit Number:  [id number]  Admission Date:  2019  Date Of Service:  19  Primary Care Physician:  Rudy Jay MD     LOS: 1 day :  Patient Care Team:  Rudy Jay MD as PCP - General (Internal Medicine)  Alexandria Rivero MD as PCP - Claims Attributed:      Subjective / Interval History:     85-year-old patient of mine who is at [facility] was admitted yesterday because of non-STEMI.  He did have chest pain initially did not want to go to the ER but family did agree and came to the ER.  His initial troponin was 0.5 and it did go up to 2 and above.  Patient is symptom-free currently on medical management and the family wants and no invasive procedure and just conservative medical management with dramatic management which is being addressed.  Overnight patient has not had any recurrent chest pain and has been feeling better.  He has not much appetite denies any nausea vomiting denies any shortness of breath.  He does have dementia and is a very poor historian       Vital Signs:    Temp:  [97.5 °F (36.4 °C)-98.4 °F (36.9 °C)] 97.7 °F (36.5 °C)  Heart Rate:  [52-98] 52  Resp:  [16-18] 18  BP: (121-171)/(69-86) 138/81    Intake and output:    No intake/output data recorded.  No intake/output data recorded.    Physical Examination:    General Appearance:    Alert and cooperative, not in any acute distress.   Head:    Atraumatic and normocephalic, without obvious abnormality.   Eyes:            PERRLA,  No pallor. Extraocular movements are within normal limits.   Neck:   Supple,  No lymph glands, no bruit   Lungs:     Chest shape is normal. Breath sounds heard bilaterally equally.  No crackles or wheezing.     Heart:    Normal S1 and S2, no murmur,  No JVD   Abdomen:     Normal bowel sounds, no masses, no organomegaly. Soft        non-tender, no  guarding, no rebound tenderness   Extremities:   Moves all extremities well, no edema, no cyanosis,    Skin:   No  bruising or rash.   Neurologic:   Grossly non focal and moves all extrimities.  He does have dementia so Gabi evaluation is difficult   Laboratory results:  Results from last 7 days   Lab Units 11/21/19  0607 11/20/19  1652   SODIUM mmol/L 137 134*   POTASSIUM mmol/L 3.9 4.3   CHLORIDE mmol/L 101 98   CO2 mmol/L 25.8 23.8   BUN mg/dL 19 20   CREATININE mg/dL 1.17 0.95   CALCIUM mg/dL 9.1 8.8   BILIRUBIN mg/dL  --  0.2   ALK PHOS U/L  --  98   ALT (SGPT) U/L  --  10   AST (SGOT) U/L  --  20   GLUCOSE mg/dL 92 146*     Results from last 7 days   Lab Units 11/21/19  0607 11/20/19  1652   WBC 10*3/mm3 11.92* 14.50*   HEMOGLOBIN g/dL 13.4 13.0   HEMATOCRIT % 39.4 37.5   PLATELETS 10*3/mm3 338 386         Results from last 7 days   Lab Units 11/21/19  0607 11/20/19  2344 11/20/19  1906   TROPONIN T ng/mL 3.420* 2.380* 0.207*           Radiology results:    Imaging Results (Last 24 Hours)     Procedure Component Value Units Date/Time    XR Chest 1 View [[id number]] Updated:  11/20/19 1704          I have reviewed the patient's radiology reports.    Medication Review:     I have reviewed the patients active and prn medications.     Assessment:      NSTEMI (non-ST elevated myocardial infarction) (CMS/[facility])  Alzheimer's dementia  BPH  Hypertension    Plan:    1.  Non-STEMI patient presented with a typical type I MI with non-STEMI and the patient and the family wants only medical management and only conservative treatment and no invasive procedures.  Patient is DNR and no intubation and is very clear with goals of care along with the patient and the wife and the son had a lengthy discussion and about advanced directives and do not want CPR or intubation or any life-sustaining measures.  Patient is stable on medical management at present with present aspirin and statins and beta-blocker.  Also the nitro will be  given and morphine if needed for pain.  Will monitor closely for post MI arrhythmias and recommended observation for the next 48 to 72 hours and mode of management and changes to be done accordingly.  2.  Hypertension-he is on beta-blocker and ACE inhibitor and pressures have improved  3.  BPH he is on Proscar and is stable at present which will be continued  Continue rest of the medication as ordered and family with the wife and the son is aware of the plan of care and anticipated hospital stay would be 2 to 3 days.    Rudy Jay MD  11/21/19  8:45 AM      Please note that portions of this note may have been completed with a voice recognition program. Efforts were made to edit the dictations, but occasionally words are mistranscribed.

## 2019-11-21 NOTE — CONSULTS
[facility] Cardiology Consult Note    Sd Anderson  1933  [id number]  19    Requesting Physician: Jes Roberts DO    Chief Complaint: Chest pain    History of Present Illness:   Mr. Sd Anderson is a 85 y.o. male who is being seen by Cardiology for evaluation of chest pain.  The patient has a past medical history significant for advanced Alzheimer's dementia and BPH.  He does not have any significant past cardiac history.  The patient currently resides in a nursing home, where on the day prior to presentation he reportedly developed an episode of severe substernal chest pain.  The episode was associated with shortness of breath and mild nausea.  As the patient does not want invasive medical treatment, they were initially reluctant to present to the hospital.  Due to persistence of the chest pain, they eventually decided to present to the hospital for evaluation.    At the time presentation, the patient was found to be hemodynamically stable.  An initial ECG revealed sinus rhythm with a right bundle branch block.  ECG did show T wave inversions in the anterior leads as well as ST depression in the inferolateral leads which is concerning for ischemia and new compared to an ECG in .  Initial labs were significant for a mild troponin elevation of 0.053.  On presentation, the patient was started on therapeutic Lovenox as well as dual antiplatelet therapy with aspirin and Brilinta.  Given the patient does not want invasive management, he was admitted to the hospital medicine service.  Overnight, the patient's troponin trended up to 3.42.  He has not had any recurrent episodes of chest pain since presentation.  Cardiology has been consulted for further management.      Prior Cardiac Testin. Last Coronary Angio: None  2. Prior Stress Testing: None  3. Last Echo: None  4. Prior Holter Monitor: None    Review of Systems:   Review of Systems   Constitutional: Negative for  activity change, appetite change, chills, diaphoresis, fatigue, fever, unexpected weight gain and unexpected weight loss.   Respiratory: Positive for chest tightness and shortness of breath. Negative for cough and wheezing.    Cardiovascular: Positive for chest pain. Negative for palpitations and leg swelling.   Gastrointestinal: Negative for abdominal pain, anal bleeding, blood in stool and GERD.   Endocrine: Negative for cold intolerance and heat intolerance.   Genitourinary: Negative for hematuria.   Neurological: Negative for dizziness, syncope, weakness and light-headedness.   Hematological: Does not bruise/bleed easily.   Psychiatric/Behavioral: Negative for depressed mood and stress. The patient is not nervous/anxious.        Past Medical History:   Past Medical History:   Diagnosis Date   • Alzheimer disease (CMS/HCC)    • BPH (benign prostatic hyperplasia)    • Dementia (CMS/HCC)    • Ingrown toenail    • Urinary tract infection        Past Surgical History:   Past Surgical History:   Procedure Laterality Date   • CATARACT EXTRACTION, BILATERAL         Family History: History reviewed. No pertinent family history.    Social History:   Social History     Socioeconomic History   • Marital status:      Spouse name: Not on file   • Number of children: Not on file   • Years of education: Not on file   • Highest education level: Not on file   Tobacco Use   • Smoking status: Former Smoker   • Smokeless tobacco: Never Used   Substance and Sexual Activity   • Alcohol use: No     Comment: rarely   • Drug use: No       Medications:     Current Facility-Administered Medications:   •  aspirin EC tablet 81 mg, 81 mg, Oral, Daily, Jes Roberts DO  •  atorvastatin (LIPITOR) tablet 40 mg, 40 mg, Oral, Nightly, Jes Roberts DO, 40 mg at 11/20/19 6030  •  finasteride (PROSCAR) tablet 5 mg, 5 mg, Oral, Daily, Jes Roberts DO  •  metoprolol tartrate (LOPRESSOR) tablet 25 mg, 25 mg, Oral,  Q12H, Jes Robertsus, DO, 25 mg at 11/21/19 0632  •  Morphine sulfate (PF) injection 2 mg, 2 mg, Intravenous, Q4H PRN **AND** naloxone (NARCAN) injection 0.4 mg, 0.4 mg, Intravenous, Q5 Min PRN, Jes Robertsus, DO  •  nitroglycerin (NITROSTAT) SL tablet 0.4 mg, 0.4 mg, Sublingual, Q5 Min PRN, Jes Roberts, DO  •  [COMPLETED] Insert peripheral IV, , , Once **AND** sodium chloride 0.9 % flush 10 mL, 10 mL, Intravenous, PRN, Josh Gomez PA-C  •  sodium chloride 0.9 % flush 10 mL, 10 mL, Intravenous, Q12H, Jes Robertsus, DO, 10 mL at 11/20/19 2300  •  sodium chloride 0.9 % flush 10 mL, 10 mL, Intravenous, PRN, Jes Roberts, DO  •  ticagrelor (BRILINTA) tablet 90 mg, 90 mg, Oral, BID, Jes Robertsus, DO    Allergies:   Allergies   Allergen Reactions   • Levofloxacin        Physical Exam:  Vital Signs:   Vitals:    11/20/19 1940 11/20/19 2353 11/21/19 0315 11/21/19 0632   BP: 158/85 169/84 166/85 171/80   BP Location:  Right arm Right arm    Patient Position:  Lying Lying    Pulse: 94 71 72 65   Resp:  16 16    Temp:  98.1 °F (36.7 °C) 97.5 °F (36.4 °C)    TempSrc:  Oral Oral    SpO2:  96% 95%    Weight:       Height:           Physical Exam   Constitutional: He is oriented to person, place, and time. He appears well-developed and well-nourished. No distress.   HENT:   Head: Normocephalic and atraumatic.   Moist Mucous Membranes.    Eyes: EOM are normal. Pupils are equal, round, and reactive to light. No scleral icterus.   Neck: No tracheal deviation present.   Cardiovascular: Normal rate, regular rhythm, normal heart sounds and intact distal pulses. Exam reveals no gallop and no friction rub.   No murmur heard.  Normal JVD.     Pulmonary/Chest: Effort normal and breath sounds normal. No stridor. No respiratory distress. He has no wheezes. He has no rales. He exhibits no tenderness.   Abdominal: Soft. Bowel sounds are normal. He exhibits no distension.  There is no tenderness. There is no rebound and no guarding.   Musculoskeletal: Normal range of motion. He exhibits no edema.   Lymphadenopathy:     He has no cervical adenopathy.   Neurological: He is alert and oriented to person, place, and time.   Skin: Skin is warm and dry. He is not diaphoretic. No erythema.   Psychiatric: He has a normal mood and affect. His behavior is normal.   Baseline dementia   Nursing note and vitals reviewed.      Results Review:   Results from last 7 days   Lab Units 11/21/19  0607 11/20/19  1652   SODIUM mmol/L 137 134*   POTASSIUM mmol/L 3.9 4.3   CHLORIDE mmol/L 101 98   CO2 mmol/L 25.8 23.8   BUN mg/dL 19 20   CREATININE mg/dL 1.17 0.95   CALCIUM mg/dL 9.1 8.8   BILIRUBIN mg/dL  --  0.2   ALK PHOS U/L  --  98   ALT (SGPT) U/L  --  10   AST (SGOT) U/L  --  20   GLUCOSE mg/dL 92 146*     Results from last 7 days   Lab Units 11/21/19  0607 11/20/19  2344 11/20/19  1906   TROPONIN T ng/mL 3.420* 2.380* 0.207*     Results from last 7 days   Lab Units 11/21/19  0607 11/20/19  1652   WBC 10*3/mm3 11.92* 14.50*   HEMOGLOBIN g/dL 13.4 13.0   HEMATOCRIT % 39.4 37.5   PLATELETS 10*3/mm3 338 386                   I personally viewed and interpreted the patient's EKG/Telemetry data     Assessment / Plan:     1. NSTEMI  --No prior cardiac history  --Presented with an episode of chest pain concerning for angina  --Initial ECG shows T wave inversions anteriorly and ST depression inferolaterally, concerning for ischemia  --Troponin continues to trend up at 3.42 -continue to trend until peak  --Presentation with chest pain, ischemic changes on ECG, and troponin elevation consistent with a type I NSTEMI  --Discussed options for management with the patient and his wife, and they do not want invasive ischemic evaluation  --Will continue medical management with therapeutic Lovenox during his hospitalization  --Continue dual antiplatelet therapy with aspirin and Brilinta  --Continue high intensity  statin  --On metoprolol and will start ACE inhibitor if tolerated by BP  --Echocardiogram pending to assess LVEF  --Anticipate proximately 72 hours of hospitalization, if no arrhythmias or recurrent episodes of chest pain    2.  Alzheimer's dementia  --Reportedly at baseline    Thank you for allowing me to participate in the care of your patient. Please to not hesitate to contact me with additional questions or concerns.     EZEQUIEL Ramirez MD  Interventional Cardiology   11/21/19  7:34 AM

## 2019-11-21 NOTE — PLAN OF CARE
Problem: Pain, Acute (Adult)  Goal: Identify Related Risk Factors and Signs and Symptoms  Outcome: Ongoing (interventions implemented as appropriate)   11/21/19 0150   Pain, Acute (Adult)   Related Risk Factors (Acute Pain) trauma   Signs and Symptoms (Acute Pain) verbalization of pain descriptors     Goal: Acceptable Pain Control/Comfort Level  Outcome: Ongoing (interventions implemented as appropriate)   11/21/19 0150   Pain, Acute (Adult)   Acceptable Pain Control/Comfort Level making progress toward outcome       Problem: Cardiac: Heart Failure (Adult)  Goal: Signs and Symptoms of Listed Potential Problems Will be Absent, Minimized or Managed (Cardiac: Heart Failure)  Outcome: Ongoing (interventions implemented as appropriate)   11/21/19 0150   Goal/Outcome Evaluation   Problems Assessed (Heart Failure) all   Problems Present (Heart Failure) situational response

## 2019-11-21 NOTE — PROGRESS NOTES
Discharge Planning Assessment  [facility]     Patient Name: Sd Anderson  MRN: [id number]  Today's Date: 11/21/2019    Admit Date: 11/20/2019    Discharge Needs Assessment     Row Name 11/21/19 1623       Living Environment    Lives With  facility resident    Name(s) of Who Lives With Patient  [facility]    Current Living Arrangements  extended care facility    Potentially Unsafe Housing Conditions  -- Wife denies any concerns.    Primary Care Provided by  other (see comments)    Provides Primary Care For  no one    Family Caregiver if Needed  none    Quality of Family Relationships  supportive    Able to Return to Prior Arrangements  yes       Resource/Environmental Concerns    Resource/Environmental Concerns  none Wife denies any financial concerns for food, utilities or medications.       Transition Planning    Patient/Family Anticipates Transition to  long term care facility    Patient/Family Anticipated Services at Transition  skilled nursing    Transportation Anticipated  other (see comments) [facility] may arrange  in [facility].       Discharge Needs Assessment    Readmission Within the Last 30 Days  no previous admission in last 30 days    Concerns to be Addressed  denies needs/concerns at this time    Equipment Currently Used at Home  wheelchair;shower chair    Anticipated Changes Related to Illness  none    Equipment Needed After Discharge  none    Outpatient/Agency/Support Group Needs  other (see comments) LTC at [facility]    Discharge Facility/Level of Care Needs  nursing facility, intermediate        Discharge Plan     Row Name 11/21/19 1628       Plan    Plan  [facility]    Patient/Family in Agreement with Plan  yes    Plan Comments Spoke to pt and wife re DCP.  Wife provides information.  Pt has been LTC at [facility] for the past 1 yr, and wife plans for pt to return.  She reports that he is WC bound and is completely dependent upon his care.  Pt is able to feed  himself with reminders.  Pt is able to travel by [employer].  Address, phone & PCP verified.  LW & POA papwork is on file.  CM will continue to follow.      Called [facility]; spoke with Kory.  Kory confirms that pt is LTC and may return.  Will accept on Saturday, if medically ready.  Also, they will work on getting a  to pick pt up.  If pt has qualifying stay, he may be returned under skilled  benefits.  Clinical updated efaxed.    F/U with pt; son (Enrique) is at bedside.  Update given.  Per Enrique, they plan for pt to return the same as when he left.          Destination      No service coordination in this encounter.      Durable Medical Equipment      No service coordination in this encounter.      Dialysis/Infusion      No service coordination in this encounter.      Home Medical Care      No service coordination in this encounter.      Therapy      No service coordination in this encounter.      Community Resources      No service coordination in this encounter.        Expected Discharge Date and Time     Expected Discharge Date Expected Discharge Time    Nov 23, 2019         Demographic Summary     Row Name 11/21/19 1622       General Information    Admission Type  inpatient    Arrived From  emergency department    Referral Source  admission list    Reason for Consult  discharge planning    Preferred Language  English     Used During This Interaction  no        Functional Status     Row Name 11/21/19 1623       Functional Status    Usual Activity Tolerance  poor       Functional Status, IADL    Medications  completely dependent    Meal Preparation  completely dependent    Housekeeping  completely dependent    Laundry  completely dependent    Shopping  completely dependent       Mental Status    General Appearance WDL  WDL       Mental Status Summary    Recent Changes in Mental Status/Cognitive Functioning  unable to assess       Employment/    Employment Status  retired         Psychosocial    No documentation.       Abuse/Neglect    No documentation.       Legal    No documentation.       Substance Abuse    No documentation.       Patient Forms    No documentation.           Jeane Dean RN

## 2019-11-21 NOTE — H&P
[facility]   HISTORY AND PHYSICAL      Name:  Sd Anderson   Age:  85 y.o.  Sex:  male  :  1933  MRN:  [id number]   Visit Number:  [id number]  Admission Date:  2019  Date Of Service:  19  Primary Care Physician:  Andrey Jade MD    Chief Complaint:     Chest pain, dyspnea    History Of Presenting Illness:      Patient is a 85-year-old gentleman with history of severe Alzheimer's dementia who presented to the emergency room today with complaints of chest pain.  Medical history significant for Alzheimer's, BPH, history of UTI.  Patient is accompanied by wife who is primary historian.  She states that patient lives at [facility].  States that approximately at noon today patient had complained of left-sided chest pain.  States he had some shortness of breath with this.  Was very uncomfortable.  She states he has had no other complaints.  No falls or injuries.  No recent travel.  No cough or palpitations.  No fevers or chills.  Denies any prior cardiac disease.  No history of hypertension, tobacco abuse, alcohol use.  She states she has significant dementia from Alzheimer's and this has been progressively worse over the last 3 years.  She notes his chest pain seemed to resolve at about 4 PM.  Had taken aspirin.    In the ER, patient's vitals show temp 98.4, heart rate 97, respirate 16, /85 with 94% oxygen sats on room air.  CBC white blood cell count 14,000, initial troponin 0 0.053, repeat 0.207.  CMP with creatinine 0.95.  Patient was given full dose aspirin, morphine.  His case was discussed with cardiology and patient's wife.  Recommended medical management versus invasive strategy.  We were asked to admit for further work-up.    Review Of Systems:     General: Denies any fevers, chills or loss of consciousness.   Psych: No history of any hallucinations and delusions.  Ophth: No history of any diplopia or transient loss of vision.  ENT: No history of sore  throat, nasal congestion or ear pain.   Allergy/immuno: No history of rash or itching.  Hem/lymph: No history of neck swelling or easy bleeding.  Endo: No history of any recent unintentional weight gain or loss.  Resp: No history of cough, positive dyspnea  Card: Positive chest pain  GI: No history of nausea, vomiting, diarrhea. Denies any abdominal pain.   : No history of dysuria or hematuria.  MSK: No muscle pain. No calf pain.   Neuro: No history of any focal weakness. No loss of consciousness. Denies any numbness.  Derm:: No history of any redness or pruritis.     Past Medical History:    Past Medical History:   Diagnosis Date   • Alzheimer disease (CMS/HCC)    • BPH (benign prostatic hyperplasia)    • Dementia (CMS/HCC)    • Ingrown toenail    • Urinary tract infection        Past Surgical history:    Past Surgical History:   Procedure Laterality Date   • CATARACT EXTRACTION, BILATERAL         Social History:    Social History     Socioeconomic History   • Marital status:      Spouse name: Not on file   • Number of children: Not on file   • Years of education: Not on file   • Highest education level: Not on file   Tobacco Use   • Smoking status: Former Smoker   • Smokeless tobacco: Never Used   Substance and Sexual Activity   • Alcohol use: No     Comment: rarely   • Drug use: No       Family History:    History reviewed. No pertinent family history.    Allergies:      Levofloxacin    Home Medications:    Prior to Admission Medications     Prescriptions Last Dose Informant Patient Reported? Taking?    cephalexin (KEFLEX) 500 MG capsule   No No    Take 1 capsule by mouth 2 (Two) Times a Day.    finasteride (PROSCAR) 5 MG tablet   No No    Take 1 tablet by mouth Daily.    latanoprost (XALATAN) 0.005 % ophthalmic solution   Yes No    Apply  to eye.    tamsulosin (FLOMAX) 0.4 MG capsule 24 hr capsule   No No    Take 1 capsule by mouth Every Night.             ED Medications:    Medications   sodium  chloride 0.9 % flush 10 mL (not administered)   Morphine sulfate (PF) injection 2 mg (2 mg Intravenous Given 11/20/19 1825)   ticagrelor (BRILINTA) tablet 180 mg (180 mg Oral Given 11/20/19 2012)   aspirin tablet 325 mg (325 mg Oral Given 11/20/19 2012)   enoxaparin (LOVENOX) syringe 90 mg (90 mg Subcutaneous Given 11/20/19 2012)       Vital Signs:    Temp:  [98.4 °F (36.9 °C)] 98.4 °F (36.9 °C)  Heart Rate:  [80-98] 94  Resp:  [16] 16  BP: (121-158)/(69-86) 158/85        11/20/19  1611   Weight: 88.5 kg (195 lb 2 oz)       Body mass index is 27.21 kg/m².    Physical Exam:    General Appearance:  Alert and cooperative, not in any acute distress.   Head:  Atraumatic and normocephalic, without obvious abnormality.   Eyes:          PERRLA, conjunctivae and sclerae normal, no Icterus. No pallor. Extraocular movements are within normal limits.   Ears:  Ears appear intact with no abnormalities noted.   Throat: No oral lesions, no thrush, oral mucosa moist.   Neck: Supple, trachea midline, no thyromegaly, no carotid bruit.   Back:   No tenderness to palpation, range of motion normal.   Lungs:   Breath sounds heard bilaterally equally.  No crackles or wheezing. No Pleural rub or bronchial breathing.   Heart:   Tachycardic normal S1 and S2, no murmur, no gallop, no rub. No JVD.   Abdomen:   Normal bowel sounds, no masses, no organomegaly. Soft, non-tender, non-distended, no guarding, no rebound tenderness.   Extremities: Moves all extremities well, no edema, no cyanosis, no clubbing.   Pulses: Pulses palpable and equal bilaterally.   Skin: No bleeding, bruising or rash.   Neurologic:  Alert, oriented to person and place.  Slow movements noted.  No tremors.  Overall strength decreased.no focal deficits.     Laboratory data:    I have reviewed the labs done in the emergency room.    Results from last 7 days   Lab Units 11/20/19  1652   SODIUM mmol/L 134*   POTASSIUM mmol/L 4.3   CHLORIDE mmol/L 98   CO2 mmol/L 23.8   BUN mg/dL  20   CREATININE mg/dL 0.95   CALCIUM mg/dL 8.8   BILIRUBIN mg/dL 0.2   ALK PHOS U/L 98   ALT (SGPT) U/L 10   AST (SGOT) U/L 20   GLUCOSE mg/dL 146*     Results from last 7 days   Lab Units 11/20/19  1652   WBC 10*3/mm3 14.50*   HEMOGLOBIN g/dL 13.0   HEMATOCRIT % 37.5   PLATELETS 10*3/mm3 386         Results from last 7 days   Lab Units 11/20/19  1906 11/20/19  1652   TROPONIN T ng/mL 0.207* 0.053*                           Invalid input(s): USDES,  BLOODU, NITRITITE, BACT, EP  Pain Management Panel     Pain Management Panel Latest Ref Rng & Units 7/27/2019    AMPHETAMINES SCREEN, URINE Negative Negative    BARBITURATES SCREEN Negative Negative    BENZODIAZEPINE SCREEN, URINE Negative Negative    BUPRENORPHINEUR Negative Negative    COCAINE SCREEN, URINE Negative Negative    METHADONE SCREEN, URINE Negative Negative    METHAMPHETAMINEUR Negative Negative              EKG:      EKG with sinus rhythm, heart rate of 97, there is a right bundle branch block.  No obvious ST elevation.    Radiology:    Imaging Results (Last 72 Hours)     Procedure Component Value Units Date/Time    XR Chest 1 View [[id number]] Updated:  11/20/19 1704      Chest x-ray reviewed by myself      NSTEMI (non-ST elevated myocardial infarction) (CMS/[facility])      Assessment:    1.  NSTEMI, present on admission, acute  2.  Elevated troponin, present on admission, acute, likely secondary to #1  3.  Advanced Alzheimer's dementia, present on admission, chronic  4.  Debility, present on admission, chronic  5.  BPH, present admission, chronic    Plan:    Unfortunate 85-year-old gentleman with long-standing Alzheimer's dementia with evidence of acute coronary syndrome.  I reiterated the plan of care with the patient and his wife at bedside.  They would not like to proceed with an invasive strategy and instead opted for medical management and comfort.  Cardiology has been consulted, patient has been placed on Lovenox, Brilinta, and aspirin.  I am going to  order a 2D echo, trend troponins, and repeat labs in the morning.  Have added beta-blocker and statin therapy.  PRN nitroglycerin and morphine.  Be monitored on telemetry.    I am going to consult palliative care to discuss goals of care.  Wife mentioned to me about potential hospice and advised will have palliative nurse to discuss goals of care with him.    Patient otherwise meets inpatient level of care secondary to acute coronary syndrome and need for further medical management and work-up.  Anticipate greater than 2 midnight stay.  Plan of care discussed with patient, wife, for which they are agreeable.    Jes Roberts,   11/20/19  8:45 PM    Dictated utilizing Dragon dictation.

## 2019-11-22 LAB
MAXIMAL PREDICTED HEART RATE: 135 BPM
MRSA SPEC QL CULT: NORMAL
STRESS TARGET HR: 115 BPM

## 2019-11-22 PROCEDURE — 99232 SBSQ HOSP IP/OBS MODERATE 35: CPT | Performed by: INTERNAL MEDICINE

## 2019-11-22 RX ORDER — LORAZEPAM 0.5 MG/1
0.5 TABLET ORAL 3 TIMES DAILY PRN
Status: DISCONTINUED | OUTPATIENT
Start: 2019-11-22 | End: 2019-11-23 | Stop reason: HOSPADM

## 2019-11-22 RX ADMIN — SODIUM CHLORIDE, PRESERVATIVE FREE 10 ML: 5 INJECTION INTRAVENOUS at 20:09

## 2019-11-22 RX ADMIN — LORAZEPAM 0.5 MG: 0.5 TABLET ORAL at 16:16

## 2019-11-22 RX ADMIN — ATORVASTATIN CALCIUM 40 MG: 40 TABLET, FILM COATED ORAL at 20:09

## 2019-11-22 RX ADMIN — TICAGRELOR 90 MG: 90 TABLET ORAL at 09:18

## 2019-11-22 RX ADMIN — ASPIRIN 81 MG: 81 TABLET, COATED ORAL at 09:17

## 2019-11-22 RX ADMIN — METOPROLOL TARTRATE 12.5 MG: 25 TABLET ORAL at 20:09

## 2019-11-22 RX ADMIN — TICAGRELOR 90 MG: 90 TABLET ORAL at 20:09

## 2019-11-22 RX ADMIN — SODIUM CHLORIDE, PRESERVATIVE FREE 10 ML: 5 INJECTION INTRAVENOUS at 09:19

## 2019-11-22 RX ADMIN — FINASTERIDE 5 MG: 5 TABLET, FILM COATED ORAL at 09:18

## 2019-11-22 RX ADMIN — METOPROLOL TARTRATE 12.5 MG: 25 TABLET ORAL at 09:18

## 2019-11-22 RX ADMIN — LISINOPRIL 5 MG: 5 TABLET ORAL at 09:18

## 2019-11-22 RX ADMIN — ISOSORBIDE MONONITRATE 30 MG: 30 TABLET, EXTENDED RELEASE ORAL at 09:18

## 2019-11-22 NOTE — PROGRESS NOTES
Continued Stay Note   Jamil     Patient Name: Sd Anderson  MRN: [id number]  Today's Date: 11/22/2019    Admit Date: 11/20/2019    Discharge Plan     Row Name 11/22/19 1331       Plan    Plan  [facility]    Patient/Family in Agreement with Plan  yes    Plan Comments Spoke to Kory with [facility].  He reports okay to accept pt on Saturday. Pt will be returned as LTC (private pay).   They need to have pt in facility by noon.  Facility will provide transportation and will need to be notifiied when pt is ready.  Report number is [phone].  Fax is [phone].  [facility] Pineda updated.     Row Name 11/22/19 1329       Plan    Plan  --        Discharge Codes    No documentation.       Expected Discharge Date and Time     Expected Discharge Date Expected Discharge Time    Nov 23, 2019             Jeane Dean RN

## 2019-11-22 NOTE — PLAN OF CARE
Problem: Patient Care Overview  Goal: Plan of Care Review  Outcome: Ongoing (interventions implemented as appropriate)   11/21/19 6934   Coping/Psychosocial   Plan of Care Reviewed With patient   Plan of Care Review   Progress no change   OTHER   Outcome Summary Pt remains on RA. No complaints. medications changed in the AM. Will continue to monitor.

## 2019-11-22 NOTE — PLAN OF CARE
Problem: Patient Care Overview  Goal: Plan of Care Review  Outcome: Ongoing (interventions implemented as appropriate)   11/22/19 0216   Coping/Psychosocial   Plan of Care Reviewed With patient;yudy   Plan of Care Review   Progress no change   OTHER   Outcome Summary Pt continues on telemetry monitoring. Remains on room air. VSS. Family supportive of care.      Goal: Individualization and Mutuality  Outcome: Ongoing (interventions implemented as appropriate)    Goal: Discharge Needs Assessment  Outcome: Ongoing (interventions implemented as appropriate)      Problem: Fall Risk (Adult)  Goal: Identify Related Risk Factors and Signs and Symptoms  Outcome: Outcome(s) achieved Date Met: 11/22/19    Goal: Absence of Fall  Outcome: Ongoing (interventions implemented as appropriate)      Problem: Skin Injury Risk (Adult)  Goal: Identify Related Risk Factors and Signs and Symptoms  Outcome: Outcome(s) achieved Date Met: 11/22/19    Goal: Skin Health and Integrity  Outcome: Ongoing (interventions implemented as appropriate)      Problem: Pain, Acute (Adult)  Goal: Identify Related Risk Factors and Signs and Symptoms  Outcome: Outcome(s) achieved Date Met: 11/22/19    Goal: Acceptable Pain Control/Comfort Level  Outcome: Ongoing (interventions implemented as appropriate)      Problem: Cardiac: Heart Failure (Adult)  Goal: Signs and Symptoms of Listed Potential Problems Will be Absent, Minimized or Managed (Cardiac: Heart Failure)  Outcome: Ongoing (interventions implemented as appropriate)

## 2019-11-22 NOTE — SIGNIFICANT NOTE
11/22/19 1051   Rehab Time/Intention   Evaluation Not Performed patient/family declined evaluation  (Upon attempt to perform PT eval, patient's wife and son decline. They state they would prefer patient to return to [facility] and have PT eval as appropriate there. PT order will be D/C'ed per family request.)   Rehab Treatment   Discipline physical therapist

## 2019-11-22 NOTE — PROGRESS NOTES
[facility]    Name:  Sd Anderson   Age:  85 y.o.  Sex:  male  :  1933  MRN:  [id number]   Visit Number:  [id number]  Admission Date:  2019  Date Of Service:  19  Primary Care Physician:  Rudy Jay MD     LOS: 2 days :  Patient Care Team:  Rudy Jay MD as PCP - General (Internal Medicine)  Alexandria Rivero MD as PCP - Claims Attributed:      Subjective / Interval History:     85-year-old patient of mine who is at [facility] was admitted yesterday because of non-STEMI.  He did have chest pain initially did not want to go to the ER but family did agree and came to the ER.  His initial troponin was 0.5 and it did go up to 2 and above.  Patient is symptom-free currently on medical management and the family wants and no invasive procedure and just conservative medical management with dramatic management which is being addressed.  Overnight patient has not had any recurrent chest pain and no arrhythmias noted on the monitor.  He has been hemodynamically stable and is feeling a little bit better.  His appetite is also better and he did eat breakfast today.      Vital Signs:    Temp:  [97.7 °F (36.5 °C)-98 °F (36.7 °C)] 97.7 °F (36.5 °C)  Heart Rate:  [62-73] 73  Resp:  [18] 18  BP: (108-143)/(56-71) 143/71    Intake and output:    I/O last 3 completed shifts:  In: 180 [P.O.:180]  Out: -   I/O this shift:  In: 240 [P.O.:240]  Out: -     Physical Examination:    General Appearance:    Alert and cooperative, not in any acute distress.   Head:    Atraumatic and normocephalic, without obvious abnormality.   Eyes:            PERRLA,  No pallor. Extraocular movements are within normal limits.   Neck:   Supple,  No lymph glands, no bruit   Lungs:     Chest shape is normal. Breath sounds heard bilaterally equally.  No crackles or wheezing.     Heart:    Normal S1 and S2, no murmur,  No JVD   Abdomen:     Normal bowel sounds, no masses, no  organomegaly. Soft        non-tender, no guarding, no rebound tenderness   Extremities:   Moves all extremities well, no edema, no cyanosis,    Skin:   No  bruising or rash.   Neurologic:   Grossly non focal and moves all extrimities.  He does have dementia so Gabi evaluation is difficult   Laboratory results:  Results from last 7 days   Lab Units 11/21/19  0607 11/20/19  1652   SODIUM mmol/L 137 134*   POTASSIUM mmol/L 3.9 4.3   CHLORIDE mmol/L 101 98   CO2 mmol/L 25.8 23.8   BUN mg/dL 19 20   CREATININE mg/dL 1.17 0.95   CALCIUM mg/dL 9.1 8.8   BILIRUBIN mg/dL  --  0.2   ALK PHOS U/L  --  98   ALT (SGPT) U/L  --  10   AST (SGOT) U/L  --  20   GLUCOSE mg/dL 92 146*     Results from last 7 days   Lab Units 11/21/19  0607 11/20/19  1652   WBC 10*3/mm3 11.92* 14.50*   HEMOGLOBIN g/dL 13.4 13.0   HEMATOCRIT % 39.4 37.5   PLATELETS 10*3/mm3 338 386         Results from last 7 days   Lab Units 11/21/19  0607 11/20/19  2344 11/20/19  1906   TROPONIN T ng/mL 3.420* 2.380* 0.207*           Radiology results:    Imaging Results (Last 24 Hours)     Procedure Component Value Units Date/Time    XR Chest 1 View [[id number]] Collected:  11/21/19 1039     Updated:  11/21/19 1043    Narrative:       PORTABLE CHEST     INDICATION: Chest pain     FINDINGS: Single frontal portable chest, compared with 07/17/2019. EKG  leads overlie the chest. Heart size is normal. No pneumothorax. Lung  volumes are diminished with some minimal opacities in the lung bases  favored to represent areas of mild scarring and/or atelectasis.  Degenerative changes are present in the spine and shoulders.       Impression:       Similar appearance of the chest. Followup PA and lateral  views would be helpful for a more complete evaluation.     This report was finalized on 11/21/2019 10:41 AM by Faisal Talbert MD.          I have reviewed the patient's radiology reports.    Medication Review:     I have reviewed the patients active and prn medications.      Assessment:      NSTEMI (non-ST elevated myocardial infarction) (CMS/[facility])  Alzheimer's dementia  BPH  Hypertension    Plan:    1.  Non-STEMI patient presented with a typical type I MI with non-STEMI and the patient and the family wants only medical management and only conservative treatment and no invasive procedures.  Patient is DNR and no intubation and is very clear with goals of care along with the patient and the wife and the son had a lengthy discussion and about advanced directives and do not want CPR or intubation or any life-sustaining measures.  Patient is stable on medical management at present with present aspirin and statins and beta-blocker.  Also the nitro will be given and morphine if needed for pain.  Will monitor closely for post MI arrhythmias and continue with the current Lovenox at therapeutic dose today.  If he remains stable he will be discharged back to nursing home on medical management.  2.  Hypertension-he is on beta-blocker and ACE inhibitor and pressures have improved  3.  BPH he is on Proscar and is stable at present which will be continued  Continue rest of the medication as ordered and family with the wife and the son is aware of the plan of care and plan is for discharge tomorrow if he remains stable.  He will continue medications as per orders at present and PT eval and treatment to be done    Rudy Jay MD  11/22/19  9:28 AM      Please note that portions of this note may have been completed with a voice recognition program. Efforts were made to edit the dictations, but occasionally words are mistranscribed.

## 2019-11-22 NOTE — PLAN OF CARE
Problem: Patient Care Overview  Goal: Plan of Care Review  Outcome: Ongoing (interventions implemented as appropriate)   11/22/19 2441   Coping/Psychosocial   Plan of Care Reviewed With patient   Plan of Care Review   Progress improving   OTHER   Outcome Summary Pt. admitted to the hosp. r/t diagnosis NSTEMI. Vital signs have remained stable so far this shift. No c/o pain or discomfort. No acute distress noted. Will cont. current plan of care at this time.

## 2019-11-22 NOTE — PROGRESS NOTES
[facility] IP Progress Note    Sd Anderson  12/8/1933  [id number]  11/22/19    Subjective:   Mr. Sd Anderson is a 85 y.o. male seen in follow-up for a type I NSTEMI.  No acute overnight events.  No significant events on review telemetry.  This morning, the patient denies recurrent episodes of chest pain.  No significant dyspnea.  Currently has no complaints.    Review of Systems:   Review of Systems   Constitutional: Negative for chills, diaphoresis, fatigue and fever.   Respiratory: Negative for cough, chest tightness, shortness of breath and wheezing.    Cardiovascular: Negative for chest pain, palpitations and leg swelling.   Gastrointestinal: Negative for abdominal pain and GERD.   Neurological: Negative for dizziness, syncope, weakness and light-headedness.       I have reviewed and/or updated the patient's past medical, past surgical, family history, social history, problem list and allergies as appropriate in the chart.     Physical Exam:  Vital Signs:   Vitals:    11/21/19 2329 11/22/19 0340 11/22/19 0748 11/22/19 1153   BP: 117/58 124/66 143/71 126/69   BP Location: Right arm Left arm Right arm Right arm   Patient Position: Lying Lying Lying Lying   Pulse: 64  73 68   Resp: 18 18 18 16   Temp: 98 °F (36.7 °C) 98 °F (36.7 °C) 97.7 °F (36.5 °C) 97.8 °F (36.6 °C)   TempSrc: Oral Oral Oral Oral   SpO2: 97% 94% 97% 97%   Weight:       Height:           Physical Exam   Constitutional: He appears well-developed and well-nourished.   Elderly male lying in bed in no acute distress.   HENT:   Head: Normocephalic and atraumatic.   Moist Mucous Membranes.    Cardiovascular: Normal rate, regular rhythm, normal heart sounds and intact distal pulses. Exam reveals no gallop and no friction rub.   No murmur heard.  Normal JVD.   Pulmonary/Chest: Effort normal and breath sounds normal. No stridor. No respiratory distress. He has no wheezes. He has no rales. He exhibits no tenderness.    Abdominal: Soft. Bowel sounds are normal. He exhibits no distension. There is no tenderness. There is no rebound and no guarding.   Musculoskeletal: Normal range of motion. He exhibits no edema.   Skin: Skin is warm and dry. He is not diaphoretic. No erythema.   Nursing note and vitals reviewed.      Results Review:   Results from last 7 days   Lab Units 19  0607 19  1652   SODIUM mmol/L 137 134*   POTASSIUM mmol/L 3.9 4.3   CHLORIDE mmol/L 101 98   CO2 mmol/L 25.8 23.8   BUN mg/dL    CREATININE mg/dL 1.17 0.95   CALCIUM mg/dL 9.1 8.8   BILIRUBIN mg/dL  --  0.2   ALK PHOS U/L  --  98   ALT (SGPT) U/L  --  10   AST (SGOT) U/L  --  20   GLUCOSE mg/dL 92 146*     Results from last 7 days   Lab Units 19  0607 19  2344 19  1906   TROPONIN T ng/mL 3.420* 2.380* 0.207*     Results from last 7 days   Lab Units 19  0607 19  1652   WBC 10*3/mm3 11.92* 14.50*   HEMOGLOBIN g/dL 13.4 13.0   HEMATOCRIT % 39.4 37.5   PLATELETS 10*3/mm3 338 386                   I personally viewed and interpreted the patient's EKG/Telemetry data     Medications:   )  Current Facility-Administered Medications:   •  aspirin EC tablet 81 mg, 81 mg, Oral, Daily, Jes Roberts DO, 81 mg at 19  •  atorvastatin (LIPITOR) tablet 40 mg, 40 mg, Oral, Nightly, Jes Roberts DO, 40 mg at 19  •  finasteride (PROSCAR) tablet 5 mg, 5 mg, Oral, Daily, Jes Roberts DO, 5 mg at 19  •  isosorbide mononitrate (IMDUR) 24 hr tablet 30 mg, 30 mg, Oral, Q24H, Rudy Jay MD, 30 mg at 19  •  lisinopril (PRINIVIL,ZESTRIL) tablet 5 mg, 5 mg, Oral, Q24H, Reji Ramirez MD, 5 mg at 19  •  metoprolol tartrate (LOPRESSOR) tablet 12.5 mg, 12.5 mg, Oral, Q12H, Rudy Jay MD, 12.5 mg at 19  •  [] Morphine sulfate (PF) injection 2 mg, 2 mg, Intravenous, Q4H PRN **AND** naloxone (NARCAN) injection 0.4 mg, 0.4 mg,  Intravenous, Q5 Min PRN, Jes Roberts, DO  •  nitroglycerin (NITROSTAT) SL tablet 0.4 mg, 0.4 mg, Sublingual, Q5 Min PRN, Jes Roberts, DO  •  [COMPLETED] Insert peripheral IV, , , Once **AND** sodium chloride 0.9 % flush 10 mL, 10 mL, Intravenous, PRN, Josh Gomez PA-C  •  sodium chloride 0.9 % flush 10 mL, 10 mL, Intravenous, Q12H, Jes Roberts, DO, 10 mL at 11/22/19 0919  •  sodium chloride 0.9 % flush 10 mL, 10 mL, Intravenous, PRN, Jes Roberts, DO  •  ticagrelor (BRILINTA) tablet 90 mg, 90 mg, Oral, BID, Jes Roberts, DO, 90 mg at 11/22/19 0918    Assessment / Plan:     1. NSTEMI  --Admitted with chest pain secondary to likely type I NSTEMI  --Initial ECG shows T wave inversions anteriorly and ST depression inferolaterally, concerning for ischemia  --Troponin elevated at 3.4, will obtain additional troponin tomorrow morning to evaluate trend  --Options for management discussed with the patient's wife, who does not want any invasive procedures given the patient's dementia and DNR status  --Continue therapeutic Lovenox for ACS protocol until discharge  --Continue dual antiplatelet therapy with aspirin and Brilinta  --Continue high intensity statin, metoprolol, lisinopril  --Echocardiogram., shows normal left ventricular systolic function  --If the patient remains chest pain-free today with no arrhythmias on telemetry and troponin level stable, then OK to discharge tomorrow     2.  Alzheimer's dementia  --Reportedly at baseline      Thank you for allowing me to participate in the care of your patient. Please to not hesitate to contact me with additional questions or concerns.     EZEQUIEL Ramirez MD  Interventional Cardiology   11/22/19  2:59 PM

## 2019-11-22 NOTE — NURSING NOTE
Pt. With increased agitation since family left. Is yelling out and attempting to get up out of bed without assist. Dr. Jay notified with new order (see MAR).

## 2019-11-23 ENCOUNTER — LAB REQUISITION (OUTPATIENT)
Dept: LAB | Facility: HOSPITAL | Age: 84
End: 2019-11-23

## 2019-11-23 VITALS
TEMPERATURE: 98.6 F | HEART RATE: 70 BPM | OXYGEN SATURATION: 94 % | RESPIRATION RATE: 16 BRPM | HEIGHT: 71 IN | WEIGHT: 194.1 LBS | DIASTOLIC BLOOD PRESSURE: 82 MMHG | SYSTOLIC BLOOD PRESSURE: 143 MMHG | BODY MASS INDEX: 27.17 KG/M2

## 2019-11-23 DIAGNOSIS — N39.0 URINARY TRACT INFECTION, SITE NOT SPECIFIED: ICD-10-CM

## 2019-11-23 LAB
ACINETOBACTER SCREEN CX: NORMAL
ALBUMIN SERPL-MCNC: 3.2 G/DL (ref 3.5–5.2)
ALBUMIN/GLOB SERPL: 0.8 G/DL
ALP SERPL-CCNC: 97 U/L (ref 39–117)
ALT SERPL W P-5'-P-CCNC: 20 U/L (ref 1–41)
ANION GAP SERPL CALCULATED.3IONS-SCNC: 15.9 MMOL/L (ref 5–15)
AST SERPL-CCNC: 54 U/L (ref 1–40)
BACTERIA UR QL AUTO: ABNORMAL /HPF
BILIRUB SERPL-MCNC: 0.7 MG/DL (ref 0.2–1.2)
BILIRUB UR QL STRIP: NEGATIVE
BUN BLD-MCNC: 30 MG/DL (ref 8–23)
BUN/CREAT SERPL: 23.6 (ref 7–25)
CALCIUM SPEC-SCNC: 8.9 MG/DL (ref 8.6–10.5)
CHLORIDE SERPL-SCNC: 100 MMOL/L (ref 98–107)
CLARITY UR: CLEAR
CO2 SERPL-SCNC: 22.1 MMOL/L (ref 22–29)
COLOR UR: YELLOW
CREAT BLD-MCNC: 1.27 MG/DL (ref 0.76–1.27)
DEPRECATED RDW RBC AUTO: 42.2 FL (ref 37–54)
ERYTHROCYTE [DISTWIDTH] IN BLOOD BY AUTOMATED COUNT: 12.6 % (ref 12.3–15.4)
GFR SERPL CREATININE-BSD FRML MDRD: 54 ML/MIN/1.73
GLOBULIN UR ELPH-MCNC: 4.2 GM/DL
GLUCOSE BLD-MCNC: 79 MG/DL (ref 65–99)
GLUCOSE UR STRIP-MCNC: NEGATIVE MG/DL
HCT VFR BLD AUTO: 38.2 % (ref 37.5–51)
HGB BLD-MCNC: 12.5 G/DL (ref 13–17.7)
HGB UR QL STRIP.AUTO: ABNORMAL
HYALINE CASTS UR QL AUTO: ABNORMAL /LPF
KETONES UR QL STRIP: ABNORMAL
LEUKOCYTE ESTERASE UR QL STRIP.AUTO: NEGATIVE
MCH RBC QN AUTO: 30 PG (ref 26.6–33)
MCHC RBC AUTO-ENTMCNC: 32.7 G/DL (ref 31.5–35.7)
MCV RBC AUTO: 91.8 FL (ref 79–97)
NITRITE UR QL STRIP: NEGATIVE
PH UR STRIP.AUTO: 5.5 [PH] (ref 5–8)
PLATELET # BLD AUTO: 336 10*3/MM3 (ref 140–450)
PMV BLD AUTO: 10 FL (ref 6–12)
POTASSIUM BLD-SCNC: 3.8 MMOL/L (ref 3.5–5.2)
PROT SERPL-MCNC: 7.4 G/DL (ref 6–8.5)
PROT UR QL STRIP: NEGATIVE
RBC # BLD AUTO: 4.16 10*6/MM3 (ref 4.14–5.8)
RBC # UR: ABNORMAL /HPF
REF LAB TEST METHOD: ABNORMAL
SODIUM BLD-SCNC: 138 MMOL/L (ref 136–145)
SP GR UR STRIP: 1.02 (ref 1–1.03)
SQUAMOUS #/AREA URNS HPF: ABNORMAL /HPF
TROPONIN T SERPL-MCNC: 1.72 NG/ML (ref 0–0.03)
UROBILINOGEN UR QL STRIP: ABNORMAL
VRE SPEC QL CULT: NORMAL
WBC NRBC COR # BLD: 9.34 10*3/MM3 (ref 3.4–10.8)
WBC UR QL AUTO: ABNORMAL /HPF

## 2019-11-23 PROCEDURE — 81001 URINALYSIS AUTO W/SCOPE: CPT | Performed by: INTERNAL MEDICINE

## 2019-11-23 PROCEDURE — 85027 COMPLETE CBC AUTOMATED: CPT | Performed by: INTERNAL MEDICINE

## 2019-11-23 PROCEDURE — 80053 COMPREHEN METABOLIC PANEL: CPT | Performed by: INTERNAL MEDICINE

## 2019-11-23 PROCEDURE — 84484 ASSAY OF TROPONIN QUANT: CPT | Performed by: INTERNAL MEDICINE

## 2019-11-23 RX ORDER — ASPIRIN 81 MG/1
81 TABLET ORAL DAILY
Start: 2019-11-24

## 2019-11-23 RX ORDER — ATORVASTATIN CALCIUM 40 MG/1
40 TABLET, FILM COATED ORAL NIGHTLY
Start: 2019-11-23

## 2019-11-23 RX ORDER — METOPROLOL SUCCINATE 25 MG/1
25 TABLET, EXTENDED RELEASE ORAL DAILY
Start: 2019-11-23

## 2019-11-23 RX ORDER — ISOSORBIDE MONONITRATE 30 MG/1
30 TABLET, EXTENDED RELEASE ORAL
Start: 2019-11-24

## 2019-11-23 RX ORDER — LISINOPRIL 5 MG/1
5 TABLET ORAL
Start: 2019-11-24

## 2019-11-23 RX ADMIN — METOPROLOL TARTRATE 12.5 MG: 25 TABLET ORAL at 08:26

## 2019-11-23 RX ADMIN — FINASTERIDE 5 MG: 5 TABLET, FILM COATED ORAL at 08:27

## 2019-11-23 RX ADMIN — TICAGRELOR 90 MG: 90 TABLET ORAL at 08:27

## 2019-11-23 RX ADMIN — LISINOPRIL 5 MG: 5 TABLET ORAL at 08:27

## 2019-11-23 RX ADMIN — ASPIRIN 81 MG: 81 TABLET, COATED ORAL at 08:26

## 2019-11-23 RX ADMIN — ISOSORBIDE MONONITRATE 30 MG: 30 TABLET, EXTENDED RELEASE ORAL at 08:27

## 2019-11-23 RX ADMIN — SODIUM CHLORIDE, PRESERVATIVE FREE 10 ML: 5 INJECTION INTRAVENOUS at 08:27

## 2019-11-23 NOTE — PLAN OF CARE
Problem: Patient Care Overview  Goal: Plan of Care Review  Outcome: Ongoing (interventions implemented as appropriate)   11/23/19 0511   Coping/Psychosocial   Plan of Care Reviewed With patient   Plan of Care Review   Progress no change   OTHER   Outcome Summary No acute events overnight. patient rested well. Continue to monitor.        Problem: Fall Risk (Adult)  Goal: Absence of Fall  Outcome: Ongoing (interventions implemented as appropriate)      Problem: Skin Injury Risk (Adult)  Goal: Skin Health and Integrity  Outcome: Ongoing (interventions implemented as appropriate)      Problem: Pain, Acute (Adult)  Goal: Acceptable Pain Control/Comfort Level  Outcome: Ongoing (interventions implemented as appropriate)      Problem: Cardiac: Heart Failure (Adult)  Goal: Signs and Symptoms of Listed Potential Problems Will be Absent, Minimized or Managed (Cardiac: Heart Failure)  Outcome: Ongoing (interventions implemented as appropriate)

## 2019-11-23 NOTE — PROGRESS NOTES
Discharge Planning Assessment  [facility]     Patient Name: Sd Anderson  MRN: [id number]  Today's Date: 11/23/2019    Admit Date: 11/20/2019    Discharge Needs Assessment    No documentation.       Discharge Plan     Row Name 11/23/19 1509       Plan    Plan Comments  [facility]   will need to see if he was skilled or went back LTC        Destination - Selection Complete      Service Provider Request Status Selected Services Address Phone Number Fax Number    [facility] [address] [facility] [address] [phone]      Durable Medical Equipment      No service coordination in this encounter.      Dialysis/Infusion      No service coordination in this encounter.      Home Medical Care      No service coordination in this encounter.      Therapy      No service coordination in this encounter.      Community Resources      No service coordination in this encounter.        Expected Discharge Date and Time     Expected Discharge Date Expected Discharge Time    Nov 23, 2019         Demographic Summary    No documentation.       Functional Status    No documentation.       Psychosocial    No documentation.       Abuse/Neglect    No documentation.       Legal    No documentation.       Substance Abuse    No documentation.       Patient Forms    No documentation.           Jeane Zuleta RN

## 2019-11-23 NOTE — DISCHARGE SUMMARY
[facility]   DISCHARGE SUMMARY      Name:  Sd Anderson   Age:  85 y.o.  Sex:  male  :  1933  MRN:  [id number]   Visit Number:  [id number]  Primary Care Physician:  Rudy Jay MD  Date of Discharge:  2019  Admission Date:  2019      Discharge Diagnosis:         NSTEMI (non-ST elevated myocardial infarction) (CMS/LTAC, located within [facility])  Alzheimer's dementia  BPH  Hypertension  Allergic rhinitis  Glaucoma        Consults:     Consults     Date and Time Order Name Status Description    20198 Inpatient Cardiology Consult Completed           Procedures Performed:                 Hospital Course:   The patient was admitted on 2019  Please see H&P for details on admission HPI and ROS.  Patient is a 85-year-old gentleman with history of severe Alzheimer's dementia who presented to the emergency room today with complaints of chest pain.  Medical history significant for Alzheimer's, BPH, history of UTI.  Patient is accompanied by wife who is primary historian.  She states that patient lives at [facility].  States that approximately at noon today patient had complained of left-sided chest pain.  States he had some shortness of breath with this.  Was very uncomfortable.  She states he has had no other complaints.  No falls or injuries.  No recent travel.  No cough or palpitations.  No fevers or chills.  Denies any prior cardiac disease.  No history of hypertension, tobacco abuse, alcohol use.  She states she has significant dementia from Alzheimer's and this has been progressively worse over the last 3 years.  She notes his chest pain seemed to resolve at about 4 PM.  Had taken aspirin.     In the ER, patient's vitals show temp 98.4, heart rate 97, respirate 16, /85 with 94% oxygen sats on room air.  CBC white blood cell count 14,000, initial troponin 0 0.053, repeat 0.207.  CMP with creatinine 0.95.  Patient was given full dose aspirin, morphine.  His case was  discussed with cardiology and patient's wife.  Recommended medical management versus invasive strategy.  We were asked to admit for further work-up.    After admission patient was started on medical management with Lovenox and medical management for his acute coronary syndrome with non-STEMI.  Dr. Ramirez was consulted.  Patient's family the wife and son and the power of  did not want any invasive procedures no aggressive treatment want him to be DNR and symptomatic management with medical management only.  Post MI he has got Lovenox for 72 hours and has no arrhythmias and has been clinically stable.  Patient will be discharged back to [facility] today on medical management as well as palliative care and does not want any further aggressive intervention.  He will be on aspirin Brilinta, beta-blocker, ACE inhibitor, statins, and Imdur.  Patient will be continued on his other medications for his dementia and BPH and will be followed up at the nursing home.  He is stable for discharge today and will benefit from rehab but family does not want it and wants just palliative care.  As per the son he did seem to be in discomfort while urinating will patient is not complaining we will try to get a UA while in the hospital at present if he gives her welts will get it at [facility].    Vital Signs:     Temp:  [97.4 °F (36.3 °C)-98.6 °F (37 °C)] 98.6 °F (37 °C)  Heart Rate:  [66-75] 70  Resp:  [16-18] 16  BP: (126-147)/(69-82) 143/82    Physical Exam:     General Appearance:    Alert and cooperative, not in any acute distress.   Head:    Atraumatic and normocephalic, without obvious abnormality.   Eyes:            PERRLA,  No pallor. Extraocular movements are within normal limits.   Neck:   Supple,  No lymph glands, no bruit   Lungs:     Chest shape is normal. Breath sounds heard bilaterally equally.  No crackles or wheezing.     Heart:    Normal S1 and S2, no murmur,  No JVD   Abdomen:     Normal bowel  sounds, no masses, no organomegaly. Soft        non-tender, no guarding, no rebound tenderness   Extremities:   Moves all extremities well, no edema, no cyanosis,    Skin:   No  bruising or rash.   Neurologic:   Grossly non focal and moves all extrimities equally.  He does have dementia and so has not evaluation is difficult       Pertinent Lab Results:     Results from last 7 days   Lab Units 11/23/19  0615 11/21/19  0607 11/20/19  1652   SODIUM mmol/L 138 137 134*   POTASSIUM mmol/L 3.8 3.9 4.3   CHLORIDE mmol/L 100 101 98   CO2 mmol/L 22.1 25.8 23.8   BUN mg/dL 30* 19 20   CREATININE mg/dL 1.27 1.17 0.95   CALCIUM mg/dL 8.9 9.1 8.8   BILIRUBIN mg/dL 0.7  --  0.2   ALK PHOS U/L 97  --  98   ALT (SGPT) U/L 20  --  10   AST (SGOT) U/L 54*  --  20   GLUCOSE mg/dL 79 92 146*     Results from last 7 days   Lab Units 11/23/19  0615 11/21/19  0607 11/20/19  1652   WBC 10*3/mm3 9.34 11.92* 14.50*   HEMOGLOBIN g/dL 12.5* 13.4 13.0   HEMATOCRIT % 38.2 39.4 37.5   PLATELETS 10*3/mm3 336 338 386         MRSA SCREEN CX   Date Value Ref Range Status   11/20/2019   Final    No Methicillin Resistant Staphylococcus aureus isolated       Pertinent Radiology Results:    Imaging Results (Most Recent)     Procedure Component Value Units Date/Time    XR Chest 1 View [[id number]] Collected:  11/21/19 1039     Updated:  11/21/19 1043    Narrative:       PORTABLE CHEST     INDICATION: Chest pain     FINDINGS: Single frontal portable chest, compared with 07/17/2019. EKG  leads overlie the chest. Heart size is normal. No pneumothorax. Lung  volumes are diminished with some minimal opacities in the lung bases  favored to represent areas of mild scarring and/or atelectasis.  Degenerative changes are present in the spine and shoulders.       Impression:       Similar appearance of the chest. Followup PA and lateral  views would be helpful for a more complete evaluation.     This report was finalized on 11/21/2019 10:41 AM by Faisal Talbert MD.                   Discharge Disposition:      Skilled Nursing Facility (DC - External)    Discharge Medication:         Discharge Medications      New Medications      Instructions Start Date   aspirin 81 MG EC tablet   81 mg, Oral, Daily   Start Date:  11/24/2019     atorvastatin 40 MG tablet  Commonly known as:  LIPITOR   40 mg, Oral, Nightly      isosorbide mononitrate 30 MG 24 hr tablet  Commonly known as:  IMDUR   30 mg, Oral, Every 24 Hours Scheduled   Start Date:  11/24/2019     lisinopril 5 MG tablet  Commonly known as:  PRINIVIL,ZESTRIL   5 mg, Oral, Every 24 Hours Scheduled   Start Date:  11/24/2019     metoprolol succinate XL 25 MG 24 hr tablet  Commonly known as:  TOPROL XL   25 mg, Oral, Daily      ticagrelor 90 MG tablet tablet  Commonly known as:  BRILINTA   90 mg, Oral, 2 Times Daily         Continue These Medications      Instructions Start Date   acetaminophen 650 MG 8 hr tablet  Commonly known as:  TYLENOL   650 mg, Oral, Every 6 Hours PRN      calcium carbonate 500 MG chewable tablet  Commonly known as:  TUMS   2 tablets, Oral, 4 Times Daily PRN      CICLOPIROX TREATMENT 8 % kit  Generic drug:  Ciclopirox   Apply externally, Apply to left great toe and 2nd right toe topically every night       citalopram 40 MG tablet  Commonly known as:  CeleXA   40 mg, Oral, Nightly      finasteride 5 MG tablet  Commonly known as:  PROSCAR   5 mg, Oral, Daily      ipratropium-albuterol 0.5-2.5 mg/3 ml nebulizer  Commonly known as:  DUO-NEB   3 mL, Nebulization, Every 6 Hours PRN      latanoprost 0.005 % ophthalmic solution  Commonly known as:  XALATAN   1 drop, Both Eyes, Nightly      melatonin 5 MG tablet tablet   10 mg, Oral, Nightly      nystatin 929383 UNIT/GM powder  Commonly known as:  MYCOSTATIN   1 application, Topical, As Needed, Apply under bilateral breasts as needed for redness      ondansetron 4 MG tablet  Commonly known as:  ZOFRAN   4 mg, Oral, Every 6 Hours PRN      polyethylene glycol  packet  Commonly known as:  MIRALAX   17 g, Oral, Daily      risperiDONE 0.5 MG tablet  Commonly known as:  risperDAL   0.5 mg, Oral, Nightly      tamsulosin 0.4 MG capsule 24 hr capsule  Commonly known as:  FLOMAX   1 capsule, Oral, Nightly         Stop These Medications    benzonatate 200 MG capsule  Commonly known as:  TESSALON     cephalexin 500 MG capsule  Commonly known as:  KEFLEX            Discharge Diet:     Healthy heart diet        Follow-up Appointments:    Follow-up will be done by me at the [facility] and [facility] appointments.      Test Results Pending at Discharge:       Order Current Status    Acinetobacter Screen - Swab, Axilla, Left Preliminary result    VRE Culture - Swab, Per Rectum Preliminary result             Rudy Jay MD  11/23/19  11:20 AM    Time:  Discharge 40 min    Please note that portions of this note may have been completed with a voice recognition program. Efforts were made to edit the dictations, but occasionally words are mistranscribed.

## 2019-11-25 NOTE — PROGRESS NOTES
Case Management Discharge Note      Final Note: [facility]         Destination - Selection Complete      Service Provider Request Status Selected Services Address Phone Number Fax Number    [facility]mediate Care [address], [facility] [address] [phone]      Durable Medical Equipment      No service has been selected for the patient.      Dialysis/Infusion      No service has been selected for the patient.      Home Medical Care      No service has been selected for the patient.      Therapy      No service has been selected for the patient.      Community Resources      No service has been selected for the patient.        Transportation Services  Private: Car    Final Discharge Disposition Code: 04 - intermediate care facility

## 2019-12-06 ENCOUNTER — APPOINTMENT (OUTPATIENT)
Dept: CT IMAGING | Facility: HOSPITAL | Age: 84
End: 2019-12-06

## 2019-12-06 ENCOUNTER — APPOINTMENT (OUTPATIENT)
Dept: GENERAL RADIOLOGY | Facility: HOSPITAL | Age: 84
End: 2019-12-06

## 2019-12-06 ENCOUNTER — HOSPITAL ENCOUNTER (EMERGENCY)
Facility: HOSPITAL | Age: 84
Discharge: NURSING, REHAB OR LONG-TERM CARE | End: 2019-12-06
Attending: EMERGENCY MEDICINE | Admitting: EMERGENCY MEDICINE

## 2019-12-06 VITALS
WEIGHT: 187 LBS | OXYGEN SATURATION: 96 % | DIASTOLIC BLOOD PRESSURE: 84 MMHG | TEMPERATURE: 97.9 F | RESPIRATION RATE: 30 BRPM | HEIGHT: 71 IN | BODY MASS INDEX: 26.18 KG/M2 | HEART RATE: 82 BPM | SYSTOLIC BLOOD PRESSURE: 122 MMHG

## 2019-12-06 DIAGNOSIS — R07.9 CHEST PAIN, UNSPECIFIED TYPE: Primary | ICD-10-CM

## 2019-12-06 DIAGNOSIS — R33.9 URINARY RETENTION: ICD-10-CM

## 2019-12-06 LAB
ALBUMIN SERPL-MCNC: 3 G/DL (ref 3.5–5.2)
ALBUMIN/GLOB SERPL: 0.8 G/DL
ALP SERPL-CCNC: 89 U/L (ref 39–117)
ALT SERPL W P-5'-P-CCNC: 103 U/L (ref 1–41)
ANION GAP SERPL CALCULATED.3IONS-SCNC: 12.4 MMOL/L (ref 5–15)
AST SERPL-CCNC: 67 U/L (ref 1–40)
BASOPHILS # BLD AUTO: 0.02 10*3/MM3 (ref 0–0.2)
BASOPHILS NFR BLD AUTO: 0.2 % (ref 0–1.5)
BILIRUB SERPL-MCNC: 0.4 MG/DL (ref 0.2–1.2)
BILIRUB UR QL STRIP: NEGATIVE
BUN BLD-MCNC: 51 MG/DL (ref 8–23)
BUN/CREAT SERPL: 32.5 (ref 7–25)
CALCIUM SPEC-SCNC: 9 MG/DL (ref 8.6–10.5)
CHLORIDE SERPL-SCNC: 106 MMOL/L (ref 98–107)
CLARITY UR: CLEAR
CO2 SERPL-SCNC: 21.6 MMOL/L (ref 22–29)
COLOR UR: YELLOW
CREAT BLD-MCNC: 1.57 MG/DL (ref 0.76–1.27)
DEPRECATED RDW RBC AUTO: 45.2 FL (ref 37–54)
EOSINOPHIL # BLD AUTO: 0.24 10*3/MM3 (ref 0–0.4)
EOSINOPHIL NFR BLD AUTO: 2.6 % (ref 0.3–6.2)
ERYTHROCYTE [DISTWIDTH] IN BLOOD BY AUTOMATED COUNT: 13.2 % (ref 12.3–15.4)
GFR SERPL CREATININE-BSD FRML MDRD: 42 ML/MIN/1.73
GLOBULIN UR ELPH-MCNC: 3.9 GM/DL
GLUCOSE BLD-MCNC: 114 MG/DL (ref 65–99)
GLUCOSE UR STRIP-MCNC: NEGATIVE MG/DL
HCT VFR BLD AUTO: 36 % (ref 37.5–51)
HGB BLD-MCNC: 11.9 G/DL (ref 13–17.7)
HGB UR QL STRIP.AUTO: NEGATIVE
IMM GRANULOCYTES # BLD AUTO: 0.07 10*3/MM3 (ref 0–0.05)
IMM GRANULOCYTES NFR BLD AUTO: 0.7 % (ref 0–0.5)
KETONES UR QL STRIP: NEGATIVE
LEUKOCYTE ESTERASE UR QL STRIP.AUTO: NEGATIVE
LIPASE SERPL-CCNC: 61 U/L (ref 13–60)
LYMPHOCYTES # BLD AUTO: 1.13 10*3/MM3 (ref 0.7–3.1)
LYMPHOCYTES NFR BLD AUTO: 12.1 % (ref 19.6–45.3)
MCH RBC QN AUTO: 31.2 PG (ref 26.6–33)
MCHC RBC AUTO-ENTMCNC: 33.1 G/DL (ref 31.5–35.7)
MCV RBC AUTO: 94.5 FL (ref 79–97)
MONOCYTES # BLD AUTO: 0.88 10*3/MM3 (ref 0.1–0.9)
MONOCYTES NFR BLD AUTO: 9.4 % (ref 5–12)
NEUTROPHILS # BLD AUTO: 7.01 10*3/MM3 (ref 1.7–7)
NEUTROPHILS NFR BLD AUTO: 75 % (ref 42.7–76)
NITRITE UR QL STRIP: NEGATIVE
NRBC BLD AUTO-RTO: 0 /100 WBC (ref 0–0.2)
PH UR STRIP.AUTO: <=5 [PH] (ref 5–8)
PLATELET # BLD AUTO: 262 10*3/MM3 (ref 140–450)
PMV BLD AUTO: 11.4 FL (ref 6–12)
POTASSIUM BLD-SCNC: 4.7 MMOL/L (ref 3.5–5.2)
PROT SERPL-MCNC: 6.9 G/DL (ref 6–8.5)
PROT UR QL STRIP: NEGATIVE
RBC # BLD AUTO: 3.81 10*6/MM3 (ref 4.14–5.8)
SODIUM BLD-SCNC: 140 MMOL/L (ref 136–145)
SP GR UR STRIP: 1.02 (ref 1–1.03)
TROPONIN T SERPL-MCNC: 0.01 NG/ML (ref 0–0.03)
TROPONIN T SERPL-MCNC: 0.01 NG/ML (ref 0–0.03)
UROBILINOGEN UR QL STRIP: NORMAL
WBC NRBC COR # BLD: 9.35 10*3/MM3 (ref 3.4–10.8)

## 2019-12-06 PROCEDURE — 93005 ELECTROCARDIOGRAM TRACING: CPT | Performed by: EMERGENCY MEDICINE

## 2019-12-06 PROCEDURE — 81003 URINALYSIS AUTO W/O SCOPE: CPT | Performed by: PHYSICIAN ASSISTANT

## 2019-12-06 PROCEDURE — 71045 X-RAY EXAM CHEST 1 VIEW: CPT

## 2019-12-06 PROCEDURE — 85025 COMPLETE CBC W/AUTO DIFF WBC: CPT | Performed by: PHYSICIAN ASSISTANT

## 2019-12-06 PROCEDURE — 51702 INSERT TEMP BLADDER CATH: CPT

## 2019-12-06 PROCEDURE — 83690 ASSAY OF LIPASE: CPT | Performed by: PHYSICIAN ASSISTANT

## 2019-12-06 PROCEDURE — 74176 CT ABD & PELVIS W/O CONTRAST: CPT

## 2019-12-06 PROCEDURE — 99284 EMERGENCY DEPT VISIT MOD MDM: CPT

## 2019-12-06 PROCEDURE — 80053 COMPREHEN METABOLIC PANEL: CPT | Performed by: PHYSICIAN ASSISTANT

## 2019-12-06 PROCEDURE — 84484 ASSAY OF TROPONIN QUANT: CPT | Performed by: PHYSICIAN ASSISTANT

## 2019-12-06 RX ORDER — SODIUM CHLORIDE 0.9 % (FLUSH) 0.9 %
10 SYRINGE (ML) INJECTION AS NEEDED
Status: DISCONTINUED | OUTPATIENT
Start: 2019-12-06 | End: 2019-12-06 | Stop reason: HOSPADM

## 2019-12-06 NOTE — ED NOTES
Contacted Dr. Jay for GOPAL Moreno, at 1430.     Marge Bansal  12/06/19 1431       Marge Bansal  12/06/19 1431

## 2019-12-06 NOTE — ED NOTES
Contacted [facility] for non-emergent transfer back to [facility] at 1440.     Marge Bansal  12/06/19 6775

## 2019-12-06 NOTE — ED NOTES
Pt is sleeping comfortably at this time. RN discussed need for IV and fluid bolus with Josh HANKS, will hold those orders for now.      Marge Stafford RN  12/06/19 6131

## 2019-12-06 NOTE — ED PROVIDER NOTES
Subjective   This patient was recently admitted to this facility for a non-STEMI.  He has a diagnosis of Alzheimer's, BPH, dementia and is at the [facility].  During his stay a few weeks ago here he had an echocardiogram showing no acute abnormality.  He is a DNR and his family does not wish for any significant interventions.  He was medically managed for his non-STEMI with Brilinta, metoprolol, isosorbide mononitrate, Lipitor.  Wife gives majority of the history given patient's Alzheimer's and she states he has been doing very well since discharge however she got a call from nursing home staff today around 830 that the patient had been complaining of chest pain, back pain and nausea.  The patient states is improved upon arrival however still present.  He rates it a 5 out of 10.            Review of Systems   Cardiovascular: Positive for chest pain.   Gastrointestinal: Positive for nausea.   Musculoskeletal: Positive for back pain.   All other systems reviewed and are negative.      Past Medical History:   Diagnosis Date   • Alzheimer disease (CMS/HCC)    • BPH (benign prostatic hyperplasia)    • Dementia (CMS/[facility])    • Ingrown toenail    • Urinary tract infection        Allergies   Allergen Reactions   • Levofloxacin        Past Surgical History:   Procedure Laterality Date   • CATARACT EXTRACTION, BILATERAL         No family history on file.    Social History     Socioeconomic History   • Marital status:      Spouse name: Not on file   • Number of children: Not on file   • Years of education: Not on file   • Highest education level: Not on file   Tobacco Use   • Smoking status: Former Smoker   • Smokeless tobacco: Never Used   Substance and Sexual Activity   • Alcohol use: No     Comment: rarely   • Drug use: No           Objective   Physical Exam   Constitutional: He appears well-developed and well-nourished.   HENT:   Head: Normocephalic and atraumatic.   Neck: Normal range of motion.  "  Cardiovascular: Normal rate and regular rhythm.   Pulmonary/Chest: Effort normal.   Abdominal: Soft.   Musculoskeletal:        Right lower leg: Normal.        Left lower leg: Normal.   Neurological: He is alert.   Psychiatric: He has a normal mood and affect. His behavior is normal.   Nursing note and vitals reviewed.      Procedures           ED Course  ED Course as of Dec 06 1434   Fri Dec 06, 2019   0938 EKG interpreted by me was right bundle branch block and a rate of 86.  No ectopy.  No ischemic changes.  Abnormal EKG  [PF]   1322 Creatinine: (!) 1.57 [TM]      ED Course User Index  [PF] Bryson Ramirez, DO  [TM] Josh Gomez PA-C      2:34 PM  Patient requesting to have a bowel movement although numerous attempts on the bedpan have proved futile.  He denies abdominal pain.  He states his chest pain is 0 out of 10 at this time.  His wife has asked nursing staff and myself to give the patient morphine \"even if it hastens his demise\" I explained that given his low blood pressure and complaint of 0 out of 10 chest pain, I am not comfortable, and there is no indication for giving him morphine at this time.    2:34 PM  Patient states chest pain is resolved.  He has had nearly 1.5 L of urine out through a Sena.  We will leave this anchored and refer to urology.  Exam reveals no impacted stool in the vault and very soft stool proximally.  Patient will continue his MiraLAX.  Will touch base with patient's PCP to arrange for repeat creatinine check and urology referral    2:34 PM  I spoke with Dr. Jay.  Made him aware of the patient's normal troponins, chest pain-free status, urinary retention and Sena insertion and a bump in the creatinine.  He states we will plan to remove the catheter and do a voiding trial in about a week and requests allergy referral as well.        MDM    Final diagnoses:   Chest pain, unspecified type   Urinary retention              Josh Gomez PA-C  12/06/19 " 1106       Josh Gomez PA-C  12/06/19 1115       Josh Gomez PA-C  12/06/19 1428       Josh Gomez PA-C  12/06/19 1434

## 2020-02-03 ENCOUNTER — TELEPHONE (OUTPATIENT)
Dept: INTERNAL MEDICINE | Facility: CLINIC | Age: 85
End: 2020-02-03

## 2020-02-03 NOTE — TELEPHONE ENCOUNTER
PATIENT WIFE HAS APPOINTMENT TODAY AND STATED THIS PATIENT HAS PASSED AWAY IN December.    PLEASE ADVISE